# Patient Record
Sex: MALE | Race: WHITE | Employment: FULL TIME | URBAN - METROPOLITAN AREA
[De-identification: names, ages, dates, MRNs, and addresses within clinical notes are randomized per-mention and may not be internally consistent; named-entity substitution may affect disease eponyms.]

---

## 2019-05-15 ENCOUNTER — OFFICE VISIT (OUTPATIENT)
Dept: FAMILY MEDICINE CLINIC | Age: 59
End: 2019-05-15

## 2019-05-15 VITALS
BODY MASS INDEX: 30.21 KG/M2 | OXYGEN SATURATION: 98 % | TEMPERATURE: 98.7 F | WEIGHT: 243 LBS | SYSTOLIC BLOOD PRESSURE: 141 MMHG | DIASTOLIC BLOOD PRESSURE: 80 MMHG | HEART RATE: 93 BPM | HEIGHT: 75 IN

## 2019-05-15 DIAGNOSIS — E11.621 DIABETIC ULCER OF TOE OF RIGHT FOOT ASSOCIATED WITH TYPE 2 DIABETES MELLITUS, LIMITED TO BREAKDOWN OF SKIN (HCC): ICD-10-CM

## 2019-05-15 DIAGNOSIS — L97.511 DIABETIC ULCER OF TOE OF RIGHT FOOT ASSOCIATED WITH TYPE 2 DIABETES MELLITUS, LIMITED TO BREAKDOWN OF SKIN (HCC): ICD-10-CM

## 2019-05-15 DIAGNOSIS — L03.90 CELLULITIS, UNSPECIFIED CELLULITIS SITE: Primary | ICD-10-CM

## 2019-05-15 PROCEDURE — 96372 THER/PROPH/DIAG INJ SC/IM: CPT | Performed by: NURSE PRACTITIONER

## 2019-05-15 PROCEDURE — 99202 OFFICE O/P NEW SF 15 MIN: CPT | Performed by: NURSE PRACTITIONER

## 2019-05-15 RX ORDER — CEPHALEXIN 500 MG/1
500 CAPSULE ORAL 3 TIMES DAILY
Qty: 30 CAPSULE | Refills: 0 | Status: SHIPPED | OUTPATIENT
Start: 2019-05-15 | End: 2019-05-25

## 2019-05-15 RX ORDER — CEFTRIAXONE 500 MG/1
1000 INJECTION, POWDER, FOR SOLUTION INTRAMUSCULAR; INTRAVENOUS ONCE
Status: COMPLETED | OUTPATIENT
Start: 2019-05-15 | End: 2019-05-15

## 2019-05-15 RX ORDER — LISINOPRIL 30 MG/1
TABLET ORAL
Refills: 0 | COMMUNITY
Start: 2019-05-13 | End: 2021-05-19

## 2019-05-15 RX ORDER — DOXYCYCLINE HYCLATE 100 MG/1
100 CAPSULE ORAL 2 TIMES DAILY
Qty: 20 CAPSULE | Refills: 0 | Status: SHIPPED | OUTPATIENT
Start: 2019-05-15 | End: 2019-05-25

## 2019-05-15 RX ADMIN — CEFTRIAXONE 1000 MG: 500 INJECTION, POWDER, FOR SOLUTION INTRAMUSCULAR; INTRAVENOUS at 18:36

## 2019-05-15 ASSESSMENT — PATIENT HEALTH QUESTIONNAIRE - PHQ9
SUM OF ALL RESPONSES TO PHQ9 QUESTIONS 1 & 2: 0
1. LITTLE INTEREST OR PLEASURE IN DOING THINGS: 0
2. FEELING DOWN, DEPRESSED OR HOPELESS: 0
SUM OF ALL RESPONSES TO PHQ QUESTIONS 1-9: 0
SUM OF ALL RESPONSES TO PHQ QUESTIONS 1-9: 0

## 2019-05-15 NOTE — PROGRESS NOTES
Genitourinary: Negative for dysuria, frequency and urgency. Musculoskeletal: Negative for neck pain and neck stiffness. Skin: Positive for wound. Negative for rash. R great toe- ongoing x's 5 days    Allergic/Immunologic: Negative. Neurological: Negative for dizziness, weakness, light-headedness and headaches. Hematological: Negative for adenopathy. Psychiatric/Behavioral: Negative for confusion. Objective:      Physical Exam   Constitutional: He is oriented to person, place, and time. He appears well-developed and well-nourished. HENT:   Head: Normocephalic. Right Ear: External ear normal.   Left Ear: External ear normal.   Nose: Nose normal.   Mouth/Throat: Oropharynx is clear and moist.   Eyes: Pupils are equal, round, and reactive to light. Conjunctivae and EOM are normal.   Neck: Normal range of motion. Neck supple. Cardiovascular: Normal rate, regular rhythm and normal heart sounds. Exam reveals no gallop and no friction rub. No murmur heard. Pulmonary/Chest: Effort normal and breath sounds normal. No respiratory distress. Abdominal: Soft. Bowel sounds are normal.   Musculoskeletal: Normal range of motion. Lymphadenopathy:     He has no cervical adenopathy. Neurological: He is alert and oriented to person, place, and time. He has normal reflexes. No cranial nerve deficit. Coordination normal.   Skin: Skin is warm and dry. No rash noted. Circular lesion noted to dorsal aspect of R great toe. Edel-wound noted with erythema. Center of wound noted with redness and small amount purulent drainage. Psychiatric: He has a normal mood and affect. Thought content normal.   Vitals reviewed. BP (!) 141/80   Pulse 93   Temp 98.7 °F (37.1 °C) (Oral)   Ht 6' 3\" (1.905 m)   Wt 243 lb (110.2 kg)   SpO2 98%   BMI 30.37 kg/m²     Assessment:       Diagnosis Orders   1.  Cellulitis, unspecified cellulitis site  cefTRIAXone (ROCEPHIN) injection 1,000 mg    cephALEXin (Any Lowery) 500 MG capsule    doxycycline hyclate (VIBRAMYCIN) 100 MG capsule   2. Diabetic ulcer of toe of right foot associated with type 2 diabetes mellitus, limited to breakdown of skin (HCC)  cephALEXin (KEFLEX) 500 MG capsule    doxycycline hyclate (VIBRAMYCIN) 100 MG capsule             Plan:     1.) Rocephin IM injection given in office   2.) Start Keflex today   3.) Start Doxycycline today   4.) Epsom Salt foot soaks PRN   5.) Podiatry referral recommended patient declines at this time. 6.) RTO in 10 days for wound check     Problem List     None           Patient given educationalmaterials - see patient instructions. Discussed use, benefit, and side effectsof prescribed medications. All patient questions answered. Pt verbalized understanding. Instructed to continue current medications, diet and exercise. Patient agreedwith treatment plan. Follow up as directed.      Electronically signed by VIVEK Velazco CNP on 5/29/2019 at 10:30 AM

## 2019-05-28 ENCOUNTER — OFFICE VISIT (OUTPATIENT)
Dept: FAMILY MEDICINE CLINIC | Age: 59
End: 2019-05-28

## 2019-05-28 ENCOUNTER — HOSPITAL ENCOUNTER (OUTPATIENT)
Age: 59
Setting detail: SPECIMEN
Discharge: HOME | End: 2019-05-28

## 2019-05-28 VITALS
DIASTOLIC BLOOD PRESSURE: 80 MMHG | HEART RATE: 88 BPM | OXYGEN SATURATION: 99 % | HEIGHT: 74 IN | WEIGHT: 254 LBS | BODY MASS INDEX: 32.6 KG/M2 | TEMPERATURE: 98.1 F | SYSTOLIC BLOOD PRESSURE: 120 MMHG | RESPIRATION RATE: 20 BRPM

## 2019-05-28 DIAGNOSIS — E11.9 TYPE 2 DIABETES MELLITUS WITHOUT COMPLICATION, WITHOUT LONG-TERM CURRENT USE OF INSULIN (HCC): Primary | ICD-10-CM

## 2019-05-28 DIAGNOSIS — Z12.5 SCREENING FOR MALIGNANT NEOPLASM OF PROSTATE: ICD-10-CM

## 2019-05-28 DIAGNOSIS — E78.5 HYPERLIPIDEMIA, UNSPECIFIED HYPERLIPIDEMIA TYPE: ICD-10-CM

## 2019-05-28 DIAGNOSIS — Z11.59 ENCOUNTER FOR HEPATITIS C SCREENING TEST FOR LOW RISK PATIENT: ICD-10-CM

## 2019-05-28 DIAGNOSIS — E78.49 OTHER HYPERLIPIDEMIA: ICD-10-CM

## 2019-05-28 DIAGNOSIS — I10 ESSENTIAL HYPERTENSION: ICD-10-CM

## 2019-05-28 DIAGNOSIS — Z11.4 SCREENING FOR HIV (HUMAN IMMUNODEFICIENCY VIRUS): ICD-10-CM

## 2019-05-28 DIAGNOSIS — A49.02 MRSA INFECTION: ICD-10-CM

## 2019-05-28 DIAGNOSIS — E11.9 TYPE 2 DIABETES MELLITUS WITHOUT COMPLICATION, WITHOUT LONG-TERM CURRENT USE OF INSULIN (HCC): ICD-10-CM

## 2019-05-28 DIAGNOSIS — S91.331A PENETRATING WOUND OF RIGHT FOOT, INITIAL ENCOUNTER: ICD-10-CM

## 2019-05-28 DIAGNOSIS — Z12.11 SCREEN FOR COLON CANCER: ICD-10-CM

## 2019-05-28 DIAGNOSIS — L30.9 DERMATITIS: ICD-10-CM

## 2019-05-28 LAB
ABSOLUTE EOS #: 0.26 K/UL (ref 0–0.44)
ABSOLUTE IMMATURE GRANULOCYTE: <0.03 K/UL (ref 0–0.3)
ABSOLUTE LYMPH #: 2.17 K/UL (ref 1.1–3.7)
ABSOLUTE MONO #: 0.52 K/UL (ref 0.1–1.2)
ALBUMIN SERPL-MCNC: 4.3 G/DL (ref 3.5–5.2)
ALBUMIN/GLOBULIN RATIO: 1.5 (ref 1–2.5)
ALP BLD-CCNC: 60 U/L (ref 40–129)
ALT SERPL-CCNC: 12 U/L (ref 5–41)
ANION GAP SERPL CALCULATED.3IONS-SCNC: 15 MMOL/L (ref 9–17)
AST SERPL-CCNC: 12 U/L
BASOPHILS # BLD: 1 % (ref 0–2)
BASOPHILS ABSOLUTE: 0.07 K/UL (ref 0–0.2)
BILIRUB SERPL-MCNC: 0.69 MG/DL (ref 0.3–1.2)
BUN BLDV-MCNC: 18 MG/DL (ref 6–20)
BUN/CREAT BLD: ABNORMAL (ref 9–20)
CALCIUM SERPL-MCNC: 9.4 MG/DL (ref 8.6–10.4)
CHLORIDE BLD-SCNC: 98 MMOL/L (ref 98–107)
CHOLESTEROL/HDL RATIO: 4.5
CHOLESTEROL: 167 MG/DL
CO2: 23 MMOL/L (ref 20–31)
CREAT SERPL-MCNC: 0.83 MG/DL (ref 0.7–1.2)
DIFFERENTIAL TYPE: ABNORMAL
EOSINOPHILS RELATIVE PERCENT: 4 % (ref 1–4)
GFR AFRICAN AMERICAN: >60 ML/MIN
GFR NON-AFRICAN AMERICAN: >60 ML/MIN
GFR SERPL CREATININE-BSD FRML MDRD: ABNORMAL ML/MIN/{1.73_M2}
GFR SERPL CREATININE-BSD FRML MDRD: ABNORMAL ML/MIN/{1.73_M2}
GLUCOSE BLD-MCNC: 176 MG/DL (ref 70–99)
HCT VFR BLD CALC: 50.6 % (ref 40.7–50.3)
HDLC SERPL-MCNC: 37 MG/DL
HEMOGLOBIN: 15.5 G/DL (ref 13–17)
IMMATURE GRANULOCYTES: 0 %
LDL CHOLESTEROL: 96 MG/DL (ref 0–130)
LYMPHOCYTES # BLD: 31 % (ref 24–43)
MCH RBC QN AUTO: 28.1 PG (ref 25.2–33.5)
MCHC RBC AUTO-ENTMCNC: 30.6 G/DL (ref 28.4–34.8)
MCV RBC AUTO: 91.7 FL (ref 82.6–102.9)
MONOCYTES # BLD: 7 % (ref 3–12)
NRBC AUTOMATED: 0 PER 100 WBC
PDW BLD-RTO: 13.3 % (ref 11.8–14.4)
PLATELET # BLD: 211 K/UL (ref 138–453)
PLATELET ESTIMATE: ABNORMAL
PMV BLD AUTO: 11.8 FL (ref 8.1–13.5)
POTASSIUM SERPL-SCNC: 3.9 MMOL/L (ref 3.7–5.3)
RBC # BLD: 5.52 M/UL (ref 4.21–5.77)
RBC # BLD: ABNORMAL 10*6/UL
SEG NEUTROPHILS: 57 % (ref 36–65)
SEGMENTED NEUTROPHILS ABSOLUTE COUNT: 4.01 K/UL (ref 1.5–8.1)
SODIUM BLD-SCNC: 136 MMOL/L (ref 135–144)
TOTAL PROTEIN: 7.2 G/DL (ref 6.4–8.3)
TRIGL SERPL-MCNC: 172 MG/DL
VLDLC SERPL CALC-MCNC: ABNORMAL MG/DL (ref 1–30)
WBC # BLD: 7.1 K/UL (ref 3.5–11.3)
WBC # BLD: ABNORMAL 10*3/UL

## 2019-05-28 PROCEDURE — 99205 OFFICE O/P NEW HI 60 MIN: CPT | Performed by: NURSE PRACTITIONER

## 2019-05-28 RX ORDER — CEPHALEXIN 500 MG/1
500 CAPSULE ORAL 3 TIMES DAILY
COMMUNITY
End: 2019-05-28

## 2019-05-28 RX ORDER — BETAMETHASONE DIPROPIONATE 0.05 %
OINTMENT (GRAM) TOPICAL
Qty: 30 G | Refills: 0 | Status: SHIPPED | OUTPATIENT
Start: 2019-05-28 | End: 2019-06-28

## 2019-05-28 RX ORDER — LISINOPRIL 30 MG/1
30 TABLET ORAL DAILY
COMMUNITY
End: 2019-05-28 | Stop reason: SDUPTHER

## 2019-05-28 RX ORDER — ATORVASTATIN CALCIUM 10 MG/1
10 TABLET, FILM COATED ORAL DAILY
COMMUNITY
End: 2019-05-28

## 2019-05-28 RX ORDER — DOXYCYCLINE HYCLATE 100 MG/1
100 CAPSULE ORAL 2 TIMES DAILY
COMMUNITY
End: 2019-05-28

## 2019-05-28 RX ORDER — MUPIROCIN CALCIUM 20 MG/G
CREAM TOPICAL
Qty: 30 G | Refills: 0 | Status: SHIPPED | OUTPATIENT
Start: 2019-05-28 | End: 2019-06-27

## 2019-05-28 RX ORDER — LISINOPRIL 30 MG/1
30 TABLET ORAL DAILY
Qty: 90 TABLET | Refills: 1 | Status: SHIPPED | OUTPATIENT
Start: 2019-05-28 | End: 2021-05-19 | Stop reason: DRUGHIGH

## 2019-05-28 SDOH — HEALTH STABILITY: MENTAL HEALTH: HOW OFTEN DO YOU HAVE A DRINK CONTAINING ALCOHOL?: MONTHLY OR LESS

## 2019-05-28 ASSESSMENT — ENCOUNTER SYMPTOMS
DIARRHEA: 0
RHINORRHEA: 0
CONSTIPATION: 0
ABDOMINAL PAIN: 0
NAUSEA: 0
EYE DISCHARGE: 0
EYE ITCHING: 0
SORE THROAT: 0
SHORTNESS OF BREATH: 0
COUGH: 0
EYE REDNESS: 0

## 2019-05-28 ASSESSMENT — PATIENT HEALTH QUESTIONNAIRE - PHQ9
SUM OF ALL RESPONSES TO PHQ QUESTIONS 1-9: 0
SUM OF ALL RESPONSES TO PHQ9 QUESTIONS 1 & 2: 0
2. FEELING DOWN, DEPRESSED OR HOPELESS: 0
SUM OF ALL RESPONSES TO PHQ QUESTIONS 1-9: 0
1. LITTLE INTEREST OR PLEASURE IN DOING THINGS: 0

## 2019-05-28 NOTE — PROGRESS NOTES
Subjective:     HPI: Aakash Hernandez is a 62 y.o. male who presents in office today with self to establish care. Old PCP was Gloria Chavez NP in [facility]. Had a job down that way. New job here in town. Has been in town 1 month now. Colonoscopy completed 2010 [facility] ([facility] 254 pounds right now. Stays right around here. Last 5 or 7 years. Recently had MRSA infection. Chronic conditions include, diabetes, high blood pressure,   Can go through xray machines but screws in right wrist. 2006/6/8 quit good job to take care of mother. Dad was also sick with parkinson's. Doesn't have insurance for 90 days right now. Couldn''t get Obamacare. Cannot remember what his last A1C was. Doesn't remember when it was done either. Colonoscopy in 2010. Completely normal. Recently treated for MRSA on his right big toe. On Monday at walk in clinic he had an open ulcer wound. Steel toed shoes rubbed it and turned into an open wound. Treated with doxy and keflex. Treated for MRSA but wasn't tested for this. Shot or Rocephin is butt also. Finished antibiotics Saturday. Dermatitis on neck has been treated as cellulitis in the last year and a half. Some improvements but just comes back. No pain, itching, swelling, or warmth. Just is red and discolored. Wondering what this is. Healing well right now. No other concerns right now. HPI    Review of Systems   Constitutional: Negative for activity change, appetite change, fatigue and fever. HENT: Negative for congestion, ear pain, postnasal drip, rhinorrhea and sore throat. Eyes: Negative for discharge, redness and itching. Respiratory: Negative for cough and shortness of breath. Cardiovascular: Negative for chest pain. Gastrointestinal: Negative for abdominal pain, constipation, diarrhea and nausea. Genitourinary: Negative for dysuria. Musculoskeletal: Negative for arthralgias and myalgias. Skin: Positive for wound (right second toe. ). Comment:  22 years. Lifestyle    Physical activity:     Days per week: None     Minutes per session: None    Stress: None   Relationships    Social connections:     Talks on phone: None     Gets together: None     Attends Jewish service: None     Active member of club or organization: None     Attends meetings of clubs or organizations: None     Relationship status: None    Intimate partner violence:     Fear of current or ex partner: None     Emotionally abused: None     Physically abused: None     Forced sexual activity: None   Other Topics Concern    None   Social History Narrative    None     Past Medical History:   Diagnosis Date    Hyperlipemia     Hypertension     MRSA infection     Type 2 diabetes mellitus without complication ([facility] [address]     Wears glasses      Past Medical, Family, and Social History reviewed and does contribute to the patient presenting condition    Health Maintenance   Topic Date Due    Potassium monitoring  1960    Creatinine monitoring  1960    Hepatitis C screen  1960    HIV screen  07/04/1975    Lipid screen  07/04/2000    Diabetes screen  07/04/2000    Shingles Vaccine (1 of 2) 07/04/2010    Colon cancer screen colonoscopy  07/04/2010    Flu vaccine (Season Ended) 09/01/2019    DTaP/Tdap/Td vaccine (2 - Td) 05/28/2020    Pneumococcal 0-64 years Vaccine  Aged Out     PHQ Scores 5/28/2019   PHQ2 Score 0   PHQ9 Score 0     Interpretation of Total Score DepressionSeverity: 1-4 = Minimal depression, 5-9 = Mild depression, 10-14 = Moderate depression, 15-19 = Moderately severe depression, 20-27 = Severe depression    Current Outpatient Medications   Medication Sig Dispense Refill    mupirocin (BACTROBAN) 2 % cream Apply 3 times daily.  30 g 0    metFORMIN (GLUCOPHAGE) 1000 MG tablet Take 1 tablet by mouth 2 times daily (with meals) 180 tablet 0    lisinopril (PRINIVIL;ZESTRIL) 30 MG tablet Take 1 tablet by mouth daily 90 tablet 1    betamethasone dipropionate (DIPROLENE) 0.05 % ointment Apply topically twice daily for two weeks. 30 g 0     No current facility-administered medications for this visit. Objective:     /80 (Site: Left Upper Arm, Position: Sitting, Cuff Size: Medium Adult)   Pulse 88   Temp 98.1 °F (36.7 °C) (Tympanic)   Resp 20   Ht 6' 2\" (1.88 m)   Wt 254 lb (115.2 kg)   SpO2 99%   BMI 32.61 kg/m²      Physical Exam   Constitutional: He is oriented to person, place, and time. He appears well-developed and well-nourished. He is active. No distress. HENT:   Head: Normocephalic and atraumatic. Right Ear: Tympanic membrane and external ear normal.   Left Ear: Tympanic membrane and external ear normal.   Nose: Nose normal.   Mouth/Throat: Uvula is midline and oropharynx is clear and moist. No oropharyngeal exudate. Eyes: Pupils are equal, round, and reactive to light. Right eye exhibits no discharge. Left eye exhibits no discharge. Cardiovascular: Normal rate, regular rhythm and normal heart sounds. No murmur heard. Pulses:       Radial pulses are 2+ on the right side, and 2+ on the left side. Pulmonary/Chest: Effort normal and breath sounds normal. No respiratory distress. He has no decreased breath sounds. He has no wheezes. Abdominal: Soft. Bowel sounds are normal.   Musculoskeletal:        Right foot: There is normal range of motion and no deformity. No visible red or swollen joints to bilateral upper and lower extremities.   Visual inspection:  Deformity/amputation: absent  Skin lesions/pre-ulcerative calluses: present - right second phalanx  Edema: right- negative, left- negative  Sensory exam:  Monofilament sensation: abnormal - slight decrease at distal phalanx  (minimum of 5 random plantar locations tested, avoiding callused areas - > 1 area with absence of sensation is + for neuropathy)  Plus at least one of the following:  Pulses: normal,   Pinprick: N/A  Proprioception: Intact  Vibration (128 Hz): N/A     Feet:   Right Foot:   Skin Integrity: Positive for ulcer, skin breakdown and erythema. Negative for warmth or dry skin. Neurological: He is alert and oriented to person, place, and time. He has normal strength. Skin: Skin is warm, dry and intact. Capillary refill takes less than 2 seconds. No rash noted. There is erythema (left and right laterl neck, otherwise asymptomatic). Ulcerated but healing foot wound to right dorsal second phalanx. Scabbed over today. No drainage, warmth, or welling, dime sized. Picture in computer. Psychiatric: He has a normal mood and affect. His speech is normal and behavior is normal.   Vitals reviewed. 2 weeks ago          Assessment:      Diagnosis Orders   1. Type 2 diabetes mellitus without complication, without long-term current use of insulin ([facility])  Hemoglobin A1C    CBC Auto Differential    metFORMIN (GLUCOPHAGE) 1000 MG tablet    HM DIABETES FOOT EXAM   2. Essential hypertension  lisinopril (PRINIVIL;ZESTRIL) 30 MG tablet   3. Hyperlipidemia, unspecified hyperlipidemia type  Lipid Panel    Comprehensive Metabolic Panel   4. Screening for malignant neoplasm of prostate  Psa screening   5. Screening for HIV (human immunodeficiency virus)  HIV Screen   6. Encounter for hepatitis C screening test for low risk patient  Hepatitis C Antibody   7. Other hyperlipidemia     8. Screen for colon cancer  COLOGUARD (For external results only)   9. Penetrating wound of right foot, initial encounter  mupirocin (BACTROBAN) 2 % cream   10. MRSA infection     11. Dermatitis  betamethasone dipropionate (DIPROLENE) 0.05 % ointment     Plan:     Return in about 6 months (around 11/28/2019), or if symptoms worsen or fail to improve, for Chronic Conditions, Re-Check. Care established in office today. Cream for wound. Cream for dermatitis on neck, doesn't look to be infectious as it has been treated in past.   Discussed red flags. Medications sent.    Likely to based on scientific evidence and factoring in their multi-faceted history. Some aspects of the visit had to be cut short due to time but patient was made away of resources and will call if any concerns arise, will further discuss at follow up visit in 1 month.

## 2019-05-28 NOTE — PATIENT INSTRUCTIONS
Address: [id number], [place] [address]   Hours:   Open ?  Closes 5PM                                 Phone: [phone]

## 2019-05-29 DIAGNOSIS — E11.9 TYPE 2 DIABETES MELLITUS WITHOUT COMPLICATION, WITHOUT LONG-TERM CURRENT USE OF INSULIN (HCC): Primary | ICD-10-CM

## 2019-05-29 DIAGNOSIS — E78.49 OTHER HYPERLIPIDEMIA: ICD-10-CM

## 2019-05-29 LAB
ESTIMATED AVERAGE GLUCOSE: 192 MG/DL
HBA1C MFR BLD: 8.3 % (ref 4–6)

## 2019-05-29 RX ORDER — ATORVASTATIN CALCIUM 10 MG/1
10 TABLET, FILM COATED ORAL DAILY
Qty: 90 TABLET | Refills: 1 | Status: SHIPPED | OUTPATIENT
Start: 2019-05-29 | End: 2019-12-20 | Stop reason: SDUPTHER

## 2019-05-29 RX ORDER — PIOGLITAZONEHYDROCHLORIDE 30 MG/1
30 TABLET ORAL DAILY
Qty: 30 TABLET | Refills: 3 | Status: SHIPPED | OUTPATIENT
Start: 2019-05-29 | End: 2021-10-20

## 2019-05-29 ASSESSMENT — ENCOUNTER SYMPTOMS
NAUSEA: 0
CHEST TIGHTNESS: 0
EYE ITCHING: 0
SHORTNESS OF BREATH: 0
EYE DISCHARGE: 0
VOMITING: 0
COUGH: 0
DIARRHEA: 0
ABDOMINAL PAIN: 0
ALLERGIC/IMMUNOLOGIC NEGATIVE: 1
SORE THROAT: 0
EYES NEGATIVE: 1

## 2019-09-11 DIAGNOSIS — E11.9 TYPE 2 DIABETES MELLITUS WITHOUT COMPLICATION, WITHOUT LONG-TERM CURRENT USE OF INSULIN (HCC): ICD-10-CM

## 2019-12-20 DIAGNOSIS — E78.49 OTHER HYPERLIPIDEMIA: ICD-10-CM

## 2019-12-20 RX ORDER — ATORVASTATIN CALCIUM 10 MG/1
TABLET, FILM COATED ORAL
Qty: 90 TABLET | Refills: 0 | OUTPATIENT
Start: 2019-12-20

## 2019-12-21 RX ORDER — ATORVASTATIN CALCIUM 10 MG/1
10 TABLET, FILM COATED ORAL DAILY
Qty: 90 TABLET | Refills: 1 | Status: SHIPPED | OUTPATIENT
Start: 2019-12-21 | End: 2021-10-08

## 2020-04-13 NOTE — TELEPHONE ENCOUNTER
LOV 5/28/19  RTO 6 months; LM for patient to schedule F/U  [facility] 9/17/19    Health Maintenance   Topic Date Due    Hepatitis C screen  1960    Pneumococcal 0-64 years Vaccine (1 of 1 - PPSV23) 07/04/1966    Diabetic retinal exam  07/04/1970    HIV screen  07/04/1975    Diabetic microalbuminuria test  07/04/1978    Hepatitis B vaccine (1 of 3 - Risk 3-dose series) 07/04/1979    Shingles Vaccine (1 of 2) 07/04/2010    Colon cancer screen colonoscopy  07/04/2010    Diabetic foot exam  05/28/2020    A1C test (Diabetic or Prediabetic)  05/28/2020    Lipid screen  05/28/2020    DTaP/Tdap/Td vaccine (2 - Td) 05/28/2020    Flu vaccine (Season Ended) 09/01/2020    Hepatitis A vaccine  Aged Out    Hib vaccine  Aged Out    Meningococcal (ACWY) vaccine  Aged Out             (applicable per patient's age: Cancer Screenings, Depression Screening, Fall Risk Screening, Immunizations)    Hemoglobin A1C (%)   Date Value   05/28/2019 8.3 (H)     LDL Cholesterol (mg/dL)   Date Value   05/28/2019 96     AST (U/L)   Date Value   05/28/2019 12     ALT (U/L)   Date Value   05/28/2019 12     BUN (mg/dL)   Date Value   05/28/2019 18      (goal A1C is < 7)   (goal LDL is <100) need 30-50% reduction from baseline     BP Readings from Last 3 Encounters:   05/28/19 120/80    (goal /80)      All Future Testing planned in [facility]:  Lab Frequency Next Occurrence   Hemoglobin A1C Once 08/29/2019       Next Visit Date:  No future appointments.          Patient Active Problem List:     Type 2 diabetes mellitus without complication ([facility] [address]     MRSA infection     Hypertension     Other hyperlipidemia

## 2021-01-11 DIAGNOSIS — E11.9 TYPE 2 DIABETES MELLITUS WITHOUT COMPLICATION, WITHOUT LONG-TERM CURRENT USE OF INSULIN (HCC): ICD-10-CM

## 2021-05-10 DIAGNOSIS — E11.9 TYPE 2 DIABETES MELLITUS WITHOUT COMPLICATION, WITHOUT LONG-TERM CURRENT USE OF INSULIN (HCC): ICD-10-CM

## 2021-05-10 NOTE — TELEPHONE ENCOUNTER
Last visit: 5/28/19  Last Med refill: 1/12/21  Does patient have enough medication for 72 hours: No      Next Visit Date:  Future Appointments   Date Time Provider Selena Gutierrez   5/19/2021  7:30 AM VIVEK Jenkins NP [address] Maintenance   Topic Date Due    Hepatitis C screen  Never done    Pneumococcal 0-64 years Vaccine (1 of 1 - PPSV23) Never done    Diabetic retinal exam  Never done    HIV screen  Never done    COVID-19 Vaccine (1) Never done    Diabetic microalbuminuria test  Never done    Shingles Vaccine (1 of 2) Never done    Colon cancer screen colonoscopy  Never done    Diabetic foot exam  05/28/2020    A1C test (Diabetic or Prediabetic)  05/28/2020    Lipid screen  05/28/2020    DTaP/Tdap/Td vaccine (2 - Td) 05/28/2020    Potassium monitoring  05/28/2020    Creatinine monitoring  05/28/2020    Flu vaccine (Season Ended) 09/01/2021    Hepatitis A vaccine  Aged Out    Hib vaccine  Aged Out    Meningococcal (ACWY) vaccine  Aged Out       Hemoglobin A1C (%)   Date Value   05/28/2019 8.3 (H)             ( goal A1C is < 7)   No results found for: LABMICR  LDL Cholesterol (mg/dL)   Date Value   05/28/2019 96       (goal LDL is <100)   AST (U/L)   Date Value   05/28/2019 12     ALT (U/L)   Date Value   05/28/2019 12     BUN (mg/dL)   Date Value   05/28/2019 18     BP Readings from Last 3 Encounters:   05/28/19 120/80   05/15/19 (!) 141/80          (goal 120/80)    All Future Testing planned in [facility]              Patient Active Problem List:     Type 2 diabetes mellitus without complication ([facility] [address]     MRSA infection     Hypertension     Other hyperlipidemia

## 2021-05-10 NOTE — TELEPHONE ENCOUNTER
Patient requesting refill of Metformin to [facility]. Manju has an appt with RAVIN Alvarado 05/19/2021.   He is out of [employer]

## 2021-05-19 ENCOUNTER — OFFICE VISIT (OUTPATIENT)
Dept: FAMILY MEDICINE CLINIC | Age: 61
End: 2021-05-19

## 2021-05-19 VITALS
OXYGEN SATURATION: 99 % | WEIGHT: 232 LBS | DIASTOLIC BLOOD PRESSURE: 100 MMHG | BODY MASS INDEX: 28.85 KG/M2 | TEMPERATURE: 97.6 F | HEART RATE: 81 BPM | SYSTOLIC BLOOD PRESSURE: 172 MMHG | RESPIRATION RATE: 14 BRPM | HEIGHT: 75 IN

## 2021-05-19 DIAGNOSIS — E11.9 TYPE 2 DIABETES MELLITUS WITHOUT COMPLICATION, WITHOUT LONG-TERM CURRENT USE OF INSULIN (HCC): Primary | ICD-10-CM

## 2021-05-19 DIAGNOSIS — Z12.11 COLON CANCER SCREENING: ICD-10-CM

## 2021-05-19 DIAGNOSIS — I10 ESSENTIAL HYPERTENSION: ICD-10-CM

## 2021-05-19 DIAGNOSIS — Z12.5 PROSTATE CANCER SCREENING: ICD-10-CM

## 2021-05-19 PROBLEM — R22.1 NECK SWELLING: Status: ACTIVE | Noted: 2019-02-06

## 2021-05-19 PROBLEM — R21 RASH AND OTHER NONSPECIFIC SKIN ERUPTION: Status: ACTIVE | Noted: 2018-09-17

## 2021-05-19 PROBLEM — R21 RASH OF NECK: Status: ACTIVE | Noted: 2019-02-06

## 2021-05-19 LAB — HBA1C MFR BLD: 8.9 %

## 2021-05-19 PROCEDURE — 3052F HG A1C>EQUAL 8.0%<EQUAL 9.0%: CPT | Performed by: NURSE PRACTITIONER

## 2021-05-19 PROCEDURE — 83036 HEMOGLOBIN GLYCOSYLATED A1C: CPT | Performed by: NURSE PRACTITIONER

## 2021-05-19 PROCEDURE — 99204 OFFICE O/P NEW MOD 45 MIN: CPT | Performed by: NURSE PRACTITIONER

## 2021-05-19 RX ORDER — LISINOPRIL 10 MG/1
10 TABLET ORAL DAILY
Qty: 30 TABLET | Refills: 1 | Status: SHIPPED | OUTPATIENT
Start: 2021-05-19 | End: 2021-07-19

## 2021-05-19 ASSESSMENT — PATIENT HEALTH QUESTIONNAIRE - PHQ9
1. LITTLE INTEREST OR PLEASURE IN DOING THINGS: 0
SUM OF ALL RESPONSES TO PHQ QUESTIONS 1-9: 0
2. FEELING DOWN, DEPRESSED OR HOPELESS: 0

## 2021-05-19 ASSESSMENT — SOCIAL DETERMINANTS OF HEALTH (SDOH): HOW HARD IS IT FOR YOU TO PAY FOR THE VERY BASICS LIKE FOOD, HOUSING, MEDICAL CARE, AND HEATING?: NOT HARD AT ALL

## 2021-05-19 ASSESSMENT — VISUAL ACUITY: OU: 1

## 2021-05-19 NOTE — PROGRESS NOTES
Rajesh Carias (:  1960) is a 61 y.o. male,Established patient; new to this provider, here for evaluation of the following chief complaint(s):  Establish Care and Diabetes         ASSESSMENT/PLAN:  1. Type 2 diabetes mellitus without complication, without long-term current use of insulin (HCC)  -     Hemoglobin A1C; Future  -     Lipid, Fasting; Future  -     Comprehensive Metabolic Panel, Fasting; Future  -     CBC; Future  -     Microalbumin, Ur; Future  -     POCT glycosylated hemoglobin (Hb A1C)  -      DIABETES FOOT EXAM  -     SITagliptin (JANUVIA) 100 MG tablet; Take 1 tablet by mouth daily, Disp-90 tablet, R-3Sample  2. Essential hypertension  -     Lipid, Fasting; Future  -     Comprehensive Metabolic Panel, Fasting; Future  -     CBC; Future  -     lisinopril (PRINIVIL;ZESTRIL) 10 MG tablet; Take 1 tablet by mouth daily, Disp-30 tablet, R-1Normal  3. Colon cancer screening  -     Cologuard (For External Results Only); Future  4. Prostate cancer screening  -     PSA Screening; Future      Return in about 6 months (around 2021), or if symptoms worsen or fail to improve, for medication follow up, routine follow up, follow up to testing. Subjective   SUBJECTIVE/OBJECTIVE:  Was working in [place] as a  just before [employer]. Lost his job. Was stuck there until he could get unemployment and get back to [place]. Is working now at a [employer]. No insurance. Has lost some weight due to more physical activity  BP elevated today. He reports its much lower at home. Has not been taking any meds except metformin due to no insurance    Is . Has 3 kids. Kids are not around the area    Denies smoking, drinking, drug use      Review of Systems   Constitutional: Negative for activity change, appetite change, fatigue and fever. HENT: Negative for congestion, ear pain, postnasal drip, rhinorrhea and sore throat.     Eyes: Negative for discharge, redness, itching and visual disturbance. Wears glasses   Respiratory: Negative for cough, chest tightness, shortness of breath and wheezing. Cardiovascular: Negative for chest pain and palpitations. Gastrointestinal: Negative for abdominal distention, abdominal pain, constipation, diarrhea, nausea and vomiting. Endocrine: Negative for polydipsia, polyphagia and polyuria. Genitourinary: Negative for difficulty urinating, dysuria and frequency. Musculoskeletal: Negative for arthralgias and myalgias. Allergic/Immunologic:        NKA   Neurological: Negative for dizziness, light-headedness and headaches. Psychiatric/Behavioral: Negative for dysphoric mood and sleep disturbance. The patient is not nervous/anxious. Objective   Physical Exam  Vitals and nursing note reviewed. Constitutional:       General: He is not in acute distress. Appearance: Normal appearance. He is well-developed and well-groomed. He is not ill-appearing or toxic-appearing. HENT:      Head: Normocephalic and atraumatic. Right Ear: Tympanic membrane, ear canal and external ear normal. No middle ear effusion. There is no impacted cerumen. Tympanic membrane is not erythematous, retracted or bulging. Left Ear: Tympanic membrane, ear canal and external ear normal.  No middle ear effusion. There is no impacted cerumen. Tympanic membrane is not erythematous, retracted or bulging. Nose: Nose normal. No mucosal edema or rhinorrhea. Right Turbinates: Not enlarged or swollen. Left Turbinates: Not enlarged or swollen. Mouth/Throat:      Lips: Pink. Mouth: Mucous membranes are moist.      Pharynx: Oropharynx is clear. Uvula midline. No oropharyngeal exudate or posterior oropharyngeal erythema. Eyes:      General: Lids are normal. Vision grossly intact. Conjunctiva/sclera: Conjunctivae normal.   Neck:      Thyroid: No thyroid tenderness. Vascular: No carotid bruit.       Trachea: Trachea normal. Cardiovascular:      Rate and Rhythm: Normal rate and regular rhythm. Pulses: Normal pulses. Carotid pulses are 2+ on the right side and 2+ on the left side. Radial pulses are 2+ on the right side and 2+ on the left side. Dorsalis pedis pulses are 2+ on the right side and 2+ on the left side. Posterior tibial pulses are 2+ on the right side and 2+ on the left side. Heart sounds: Normal heart sounds, S1 normal and S2 normal. No murmur heard. Pulmonary:      Effort: Pulmonary effort is normal. No prolonged expiration or respiratory distress. Breath sounds: Normal breath sounds and air entry. No decreased breath sounds, wheezing, rhonchi or rales. Abdominal:      General: There is no distension. Palpations: Abdomen is soft. Tenderness: There is no abdominal tenderness. Musculoskeletal:         General: Normal range of motion. Cervical back: Normal range of motion. No signs of trauma or torticollis. No pain with movement. Right lower leg: No edema. Left lower leg: No edema. Lymphadenopathy:      Cervical: No cervical adenopathy. Skin:     General: Skin is warm and dry. Capillary Refill: Capillary refill takes less than 2 seconds. Coloration: Skin is not ashen, cyanotic, jaundiced or pale. Neurological:      Mental Status: He is alert and oriented to person, place, and time. Motor: Motor function is intact. Gait: Gait normal.   Psychiatric:         Attention and Perception: Attention and perception normal.         Mood and Affect: Mood and affect normal.         Speech: Speech normal.         Behavior: Behavior normal. Behavior is cooperative. Thought Content: Thought content normal.         Cognition and Memory: Cognition and memory normal.         Judgment: Judgment normal.                  An electronic signature was used to authenticate this note.     --VIVEK Mejia - HOLLY

## 2021-05-31 ASSESSMENT — ENCOUNTER SYMPTOMS
ABDOMINAL DISTENTION: 0
SORE THROAT: 0
VOMITING: 0
ABDOMINAL PAIN: 0
SHORTNESS OF BREATH: 0
COUGH: 0
WHEEZING: 0
RHINORRHEA: 0
CHEST TIGHTNESS: 0
EYE DISCHARGE: 0
EYE REDNESS: 0
EYE ITCHING: 0
DIARRHEA: 0
ALLERGIC/IMMUNOLOGIC COMMENTS: NKA
CONSTIPATION: 0
NAUSEA: 0

## 2021-07-17 DIAGNOSIS — I10 ESSENTIAL HYPERTENSION: ICD-10-CM

## 2021-07-19 NOTE — TELEPHONE ENCOUNTER
infection     Hypertension     Other hyperlipidemia     Neck swelling     Rash of neck     Rash and other nonspecific skin eruption

## 2021-07-20 RX ORDER — LISINOPRIL 10 MG/1
10 TABLET ORAL DAILY
Qty: 30 TABLET | Refills: 5 | Status: SHIPPED | OUTPATIENT
Start: 2021-07-20 | End: 2021-10-08 | Stop reason: ALTCHOICE

## 2021-08-24 DIAGNOSIS — E11.9 TYPE 2 DIABETES MELLITUS WITHOUT COMPLICATION, WITHOUT LONG-TERM CURRENT USE OF INSULIN (HCC): ICD-10-CM

## 2021-08-24 NOTE — TELEPHONE ENCOUNTER
----- Message from Maru Castro sent at 8/24/2021  4:59 PM EDT -----  Subject: Refill Request    QUESTIONS  Name of Medication? metFORMIN (GLUCOPHAGE) 1000 MG tablet  Patient-reported dosage and instructions? twice a day   How many days do you have left? 2  Preferred Pharmacy? [id number] [facility] phone number (if available)? [phone]  Additional Information for Provider? Patient has tried to make an   appointment to get these refill and there is no availability .   ---------------------------------------------------------------------------  --------------  CALL BACK INFO  What is the best way for the office to contact you? OK to leave message on   voicemail  Preferred Call Back Phone Number?  [phone]

## 2021-08-25 ENCOUNTER — TELEPHONE (OUTPATIENT)
Dept: FAMILY MEDICINE CLINIC | Age: 61
End: 2021-08-25

## 2021-08-25 NOTE — TELEPHONE ENCOUNTER
----- Message from Karthik Beth sent at 8/24/2021  4:57 PM EDT -----  Subject: Appointment Request    Reason for Call: Routine Existing Condition Follow Up (Diabetes)    QUESTIONS  Type of Appointment? Established Patient  Reason for appointment request? No appointments available during search  Additional Information for Provider? Patient is calling to make a follow   up appointment. Diabetes management and refills on his meds . Patient will   be out of his meds as of tomorrow. ---------------------------------------------------------------------------  --------------  Piero Valarie INFO  What is the best way for the office to contact you? OK to leave message on   voicemail  Preferred Call Back Phone Number? [phone]  ---------------------------------------------------------------------------  --------------  SCRIPT ANSWERS  Relationship to Patient? Self  (Is the patient requesting to be seen urgently for their symptoms?)? No  Is this follow up request related to routine Diabetes Management? Yes  Are you having any new concerns about your existing condition? No  Have you been diagnosed with, awaiting test results for, or told that you   are suspected of having COVID-19 (Coronavirus)? (If patient has tested   negative or was tested as a requirement for work, school, or travel and   not based on symptoms, answer no)? No  Do you currently have flu-like symptoms including fever or chills, cough,   shortness of breath, difficulty breathing, or new loss of taste or smell? No  Have you had close contact with someone with COVID-19 in the last 14 days? No  (Service Expert  click yes below to proceed with [employer] As Usual   Scheduling)?  Yes

## 2021-10-08 ENCOUNTER — OFFICE VISIT (OUTPATIENT)
Dept: FAMILY MEDICINE CLINIC | Age: 61
End: 2021-10-08
Payer: COMMERCIAL

## 2021-10-08 VITALS
OXYGEN SATURATION: 99 % | HEART RATE: 90 BPM | HEIGHT: 75 IN | RESPIRATION RATE: 14 BRPM | TEMPERATURE: 97.2 F | DIASTOLIC BLOOD PRESSURE: 89 MMHG | WEIGHT: 240 LBS | BODY MASS INDEX: 29.84 KG/M2 | SYSTOLIC BLOOD PRESSURE: 158 MMHG

## 2021-10-08 DIAGNOSIS — E11.9 TYPE 2 DIABETES MELLITUS WITHOUT COMPLICATION, WITHOUT LONG-TERM CURRENT USE OF INSULIN (HCC): Primary | ICD-10-CM

## 2021-10-08 DIAGNOSIS — I10 ESSENTIAL HYPERTENSION: ICD-10-CM

## 2021-10-08 PROCEDURE — 3052F HG A1C>EQUAL 8.0%<EQUAL 9.0%: CPT | Performed by: NURSE PRACTITIONER

## 2021-10-08 PROCEDURE — 99214 OFFICE O/P EST MOD 30 MIN: CPT | Performed by: NURSE PRACTITIONER

## 2021-10-08 RX ORDER — LISINOPRIL AND HYDROCHLOROTHIAZIDE 12.5; 1 MG/1; MG/1
1 TABLET ORAL DAILY
Qty: 90 TABLET | Refills: 1 | Status: SHIPPED | OUTPATIENT
Start: 2021-10-08 | End: 2022-04-19

## 2021-10-08 RX ORDER — BLOOD PRESSURE TEST KIT
1 KIT MISCELLANEOUS 2 TIMES DAILY
Qty: 1 KIT | Refills: 0 | Status: SHIPPED | OUTPATIENT
Start: 2021-10-08 | End: 2022-10-08

## 2021-10-08 ASSESSMENT — PATIENT HEALTH QUESTIONNAIRE - PHQ9
SUM OF ALL RESPONSES TO PHQ9 QUESTIONS 1 & 2: 0
1. LITTLE INTEREST OR PLEASURE IN DOING THINGS: 0
SUM OF ALL RESPONSES TO PHQ QUESTIONS 1-9: 0
2. FEELING DOWN, DEPRESSED OR HOPELESS: 0
SUM OF ALL RESPONSES TO PHQ QUESTIONS 1-9: 0
SUM OF ALL RESPONSES TO PHQ QUESTIONS 1-9: 0

## 2021-10-08 NOTE — PROGRESS NOTES
Kalpesh Vaughan (:  1960) is a 64 y.o. male,Established patient, here for evaluation of the following chief complaint(s):  Diabetes         ASSESSMENT/PLAN:  1. Type 2 diabetes mellitus without complication, without long-term current use of insulin ([facility] [address]  2. Essential hypertension  -     lisinopril-hydroCHLOROthiazide (PRINZIDE;ZESTORETIC) 10-12.5 MG per tablet; Take 1 tablet by mouth daily, Disp-90 tablet, R-1Normal  -     Blood Pressure KIT; 2 TIMES DAILY Starting Fri 10/8/2021, Until Sat 10/8/2022, For 365 days, Disp-1 kit, R-0, Normal      Return in about 6 months (around 2022), or if symptoms worsen or fail to improve, for medication follow up, follow up to testing. Subjective   SUBJECTIVE/OBJECTIVE:  Presents to office today for routine follow up. History of DM, HTN. Reports he is doing well. Is now working for [employer] in [place]. Things have improved since last visit. Now has a steady income. Thinking about retiring next year for one year and then re-enter the work force. 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 Reports a good appetite, drinking fluids. Normal bowel and bladder pattern. Sleeping ok-off due to night shift. Denies depression/anxiety concerns. Review of Systems   Constitutional: Negative for activity change, appetite change, fatigue and fever. HENT: Negative for congestion, ear pain, postnasal drip, rhinorrhea and sore throat. Eyes: Negative for discharge, redness, itching and visual disturbance. Wears glasses   Respiratory: Negative for cough, chest tightness, shortness of breath and wheezing. Cardiovascular: Negative for chest pain and palpitations. Gastrointestinal: Negative for abdominal distention, abdominal pain, constipation, diarrhea, nausea and vomiting. Endocrine: Negative for polydipsia, polyphagia and polyuria. Genitourinary: Negative for difficulty urinating, dysuria and frequency.    Musculoskeletal: Negative for arthralgias and myalgias. Allergic/Immunologic:        NKA   Neurological: Negative for dizziness, light-headedness and headaches. Psychiatric/Behavioral: Negative for dysphoric mood and sleep disturbance. The patient is not nervous/anxious. Objective   Physical Exam  Vitals and nursing note reviewed. Constitutional:       General: He is not in acute distress. Appearance: Normal appearance. He is well-developed and well-groomed. He is not ill-appearing or toxic-appearing. HENT:      Head: Normocephalic and atraumatic. Right Ear: Tympanic membrane, ear canal and external ear normal. No middle ear effusion. There is no impacted cerumen. Tympanic membrane is not erythematous, retracted or bulging. Left Ear: Tympanic membrane, ear canal and external ear normal.  No middle ear effusion. There is no impacted cerumen. Tympanic membrane is not erythematous, retracted or bulging. Nose: Nose normal. No mucosal edema or rhinorrhea. Right Turbinates: Not enlarged or swollen. Left Turbinates: Not enlarged or swollen. Mouth/Throat:      Lips: Pink. Mouth: Mucous membranes are moist.      Pharynx: Oropharynx is clear. Uvula midline. No oropharyngeal exudate or posterior oropharyngeal erythema. Eyes:      General: Lids are normal. Vision grossly intact. Conjunctiva/sclera: Conjunctivae normal.   Neck:      Thyroid: No thyroid tenderness. Vascular: No carotid bruit. Trachea: Trachea normal.   Cardiovascular:      Rate and Rhythm: Normal rate and regular rhythm. Pulses: Normal pulses. Carotid pulses are 2+ on the right side and 2+ on the left side. Radial pulses are 2+ on the right side and 2+ on the left side. Dorsalis pedis pulses are 2+ on the right side and 2+ on the left side. Posterior tibial pulses are 2+ on the right side and 2+ on the left side. Heart sounds: Normal heart sounds, S1 normal and S2 normal. No murmur heard. Pulmonary:      Effort: Pulmonary effort is normal. No prolonged expiration or respiratory distress. Breath sounds: Normal breath sounds and air entry. No decreased breath sounds, wheezing, rhonchi or rales. Abdominal:      General: There is no distension. Palpations: Abdomen is soft. Tenderness: There is no abdominal tenderness. Musculoskeletal:         General: Normal range of motion. Cervical back: Normal range of motion. No signs of trauma or torticollis. No pain with movement. Right lower leg: No edema. Left lower leg: No edema. Lymphadenopathy:      Cervical: No cervical adenopathy. Skin:     General: Skin is warm and dry. Capillary Refill: Capillary refill takes less than 2 seconds. Coloration: Skin is not ashen, cyanotic, jaundiced or pale. Neurological:      Mental Status: He is alert and oriented to person, place, and time. Motor: Motor function is intact. Gait: Gait normal.   Psychiatric:         Attention and Perception: Attention and perception normal.         Mood and Affect: Mood and affect normal.         Speech: Speech normal.         Behavior: Behavior normal. Behavior is cooperative. Thought Content: Thought content normal.         Cognition and Memory: Cognition and memory normal.         Judgment: Judgment normal.                  An electronic signature was used to authenticate this note.     --VIVEK Echavarria NP

## 2021-10-18 ASSESSMENT — VISUAL ACUITY: OU: 1

## 2021-10-18 ASSESSMENT — ENCOUNTER SYMPTOMS
VOMITING: 0
SHORTNESS OF BREATH: 0
EYE DISCHARGE: 0
RHINORRHEA: 0
EYE ITCHING: 0
ABDOMINAL PAIN: 0
CHEST TIGHTNESS: 0
SORE THROAT: 0
ABDOMINAL DISTENTION: 0
DIARRHEA: 0
CONSTIPATION: 0
NAUSEA: 0
EYE REDNESS: 0
COUGH: 0
ALLERGIC/IMMUNOLOGIC COMMENTS: NKA
WHEEZING: 0

## 2021-10-19 ENCOUNTER — HOSPITAL ENCOUNTER (OUTPATIENT)
Age: 61
Setting detail: SPECIMEN
Discharge: HOME | End: 2021-10-19
Payer: COMMERCIAL

## 2021-10-19 DIAGNOSIS — Z12.5 PROSTATE CANCER SCREENING: ICD-10-CM

## 2021-10-19 DIAGNOSIS — I10 ESSENTIAL HYPERTENSION: ICD-10-CM

## 2021-10-19 DIAGNOSIS — E11.9 TYPE 2 DIABETES MELLITUS WITHOUT COMPLICATION, WITHOUT LONG-TERM CURRENT USE OF INSULIN (HCC): ICD-10-CM

## 2021-10-19 LAB
ALBUMIN SERPL-MCNC: 4.6 G/DL (ref 3.5–5.2)
ALBUMIN/GLOBULIN RATIO: 1.5 (ref 1–2.5)
ALP BLD-CCNC: 84 U/L (ref 40–129)
ALT SERPL-CCNC: 16 U/L (ref 5–41)
ANION GAP SERPL CALCULATED.3IONS-SCNC: 16 MMOL/L (ref 9–17)
AST SERPL-CCNC: 14 U/L
BILIRUB SERPL-MCNC: 0.93 MG/DL (ref 0.3–1.2)
BUN BLDV-MCNC: 23 MG/DL (ref 8–23)
BUN/CREAT BLD: ABNORMAL (ref 9–20)
CALCIUM SERPL-MCNC: 9.7 MG/DL (ref 8.6–10.4)
CHLORIDE BLD-SCNC: 91 MMOL/L (ref 98–107)
CHOLESTEROL, FASTING: 238 MG/DL
CHOLESTEROL/HDL RATIO: 5.7
CO2: 25 MMOL/L (ref 20–31)
CREAT SERPL-MCNC: 0.81 MG/DL (ref 0.7–1.2)
CREATININE URINE: 86.4 MG/DL (ref 39–259)
GFR AFRICAN AMERICAN: >60 ML/MIN
GFR NON-AFRICAN AMERICAN: >60 ML/MIN
GFR SERPL CREATININE-BSD FRML MDRD: ABNORMAL ML/MIN/{1.73_M2}
GFR SERPL CREATININE-BSD FRML MDRD: ABNORMAL ML/MIN/{1.73_M2}
GLUCOSE FASTING: 255 MG/DL (ref 70–99)
HCT VFR BLD CALC: 53 % (ref 40.7–50.3)
HDLC SERPL-MCNC: 42 MG/DL
HEMOGLOBIN: 17.3 G/DL (ref 13–17)
LDL CHOLESTEROL: 128 MG/DL (ref 0–130)
MCH RBC QN AUTO: 29 PG (ref 25.2–33.5)
MCHC RBC AUTO-ENTMCNC: 32.6 G/DL (ref 28.4–34.8)
MCV RBC AUTO: 88.9 FL (ref 82.6–102.9)
MICROALBUMIN/CREAT 24H UR: <12 MG/L
MICROALBUMIN/CREAT UR-RTO: NORMAL MCG/MG CREAT
NRBC AUTOMATED: 0 PER 100 WBC
PDW BLD-RTO: 12.6 % (ref 11.8–14.4)
PLATELET # BLD: 241 K/UL (ref 138–453)
PMV BLD AUTO: 11.4 FL (ref 8.1–13.5)
POTASSIUM SERPL-SCNC: 3.9 MMOL/L (ref 3.7–5.3)
PROSTATE SPECIFIC ANTIGEN: 1.35 UG/L
RBC # BLD: 5.96 M/UL (ref 4.21–5.77)
SODIUM BLD-SCNC: 132 MMOL/L (ref 135–144)
TOTAL PROTEIN: 7.6 G/DL (ref 6.4–8.3)
TRIGLYCERIDE, FASTING: 341 MG/DL
VLDLC SERPL CALC-MCNC: ABNORMAL MG/DL (ref 1–30)
WBC # BLD: 9.7 K/UL (ref 3.5–11.3)

## 2021-10-20 ENCOUNTER — TELEPHONE (OUTPATIENT)
Dept: FAMILY MEDICINE CLINIC | Age: 61
End: 2021-10-20

## 2021-10-20 DIAGNOSIS — E78.2 MIXED HYPERLIPIDEMIA: Primary | ICD-10-CM

## 2021-10-20 DIAGNOSIS — E11.9 TYPE 2 DIABETES MELLITUS WITHOUT COMPLICATION, WITHOUT LONG-TERM CURRENT USE OF INSULIN (HCC): ICD-10-CM

## 2021-10-20 DIAGNOSIS — E78.2 MIXED HYPERLIPIDEMIA: ICD-10-CM

## 2021-10-20 LAB
ESTIMATED AVERAGE GLUCOSE: 249 MG/DL
HBA1C MFR BLD: 10.3 % (ref 4–6)

## 2021-10-20 RX ORDER — ATORVASTATIN CALCIUM 20 MG/1
20 TABLET, FILM COATED ORAL DAILY
Qty: 90 TABLET | Refills: 1 | Status: SHIPPED | OUTPATIENT
Start: 2021-10-20 | End: 2021-10-20 | Stop reason: SDUPTHER

## 2021-10-20 RX ORDER — ATORVASTATIN CALCIUM 20 MG/1
20 TABLET, FILM COATED ORAL DAILY
Qty: 90 TABLET | Refills: 1 | Status: SHIPPED | OUTPATIENT
Start: 2021-10-20 | End: 2022-04-19

## 2021-11-20 DIAGNOSIS — E11.9 TYPE 2 DIABETES MELLITUS WITHOUT COMPLICATION, WITHOUT LONG-TERM CURRENT USE OF INSULIN (HCC): ICD-10-CM

## 2021-11-22 NOTE — TELEPHONE ENCOUNTER
Last visit: 10/8/21  Last Med refill: 8/25/21    Next Visit Date:  No future appointments. Health Maintenance   Topic Date Due    Colon cancer screen colonoscopy  Never done    DTaP/Tdap/Td vaccine (2 - Td or Tdap) 05/19/2022 (Originally 5/28/2020)    Shingles Vaccine (1 of 2) 05/19/2022 (Originally 7/4/2010)    Pneumococcal 0-64 years Vaccine (1 of 2 - PPSV23) 05/19/2022 (Originally 7/4/1966)    COVID-19 Vaccine (1) 05/19/2022 (Originally 7/4/1972)    Diabetic retinal exam  05/29/2022 (Originally 7/4/1970)    Flu vaccine (1) 10/08/2022 (Originally 9/1/2021)    A1C test (Diabetic or Prediabetic)  01/19/2022    Diabetic foot exam  05/19/2022    Diabetic microalbuminuria test  10/19/2022    Lipid screen  10/19/2022    Potassium monitoring  10/19/2022    Creatinine monitoring  10/19/2022    Hepatitis A vaccine  Aged Out    Hib vaccine  Aged Out    Meningococcal (ACWY) vaccine  Aged Out    Hepatitis C screen  Discontinued    HIV screen  Discontinued       Hemoglobin A1C (%)   Date Value   10/19/2021 10.3 (H)   05/19/2021 8.9   05/28/2019 8.3 (H)             ( goal A1C is < 7)   Microalb/Crt.  Ratio (mcg/mg creat)   Date Value   10/19/2021 CANNOT BE CALCULATED     LDL Cholesterol (mg/dL)   Date Value   10/19/2021 128   05/28/2019 96       (goal LDL is <100)   AST (U/L)   Date Value   10/19/2021 14     ALT (U/L)   Date Value   10/19/2021 16     BUN (mg/dL)   Date Value   10/19/2021 23     BP Readings from Last 3 Encounters:   10/08/21 (!) 158/89   05/19/21 (!) 172/100   05/28/19 120/80          (goal 120/80)    All Future Testing planned in [facility]  Lab Frequency Next Occurrence   Cologuard (For External Results Only) Once 05/19/2022               Patient Active Problem List:     Type 2 diabetes mellitus without complication (HCC)     MRSA infection     Hypertension     Other hyperlipidemia     Neck swelling     Rash of neck     Rash and other nonspecific skin eruption

## 2021-12-28 NOTE — TELEPHONE ENCOUNTER
GENTEAL GEL AND AT Q 2-3 HOURS FOR COMFORT - REASURED PATIENT. Orders Pended for editing and approval.

## 2022-02-21 DIAGNOSIS — I10 ESSENTIAL HYPERTENSION: ICD-10-CM

## 2022-02-21 NOTE — TELEPHONE ENCOUNTER
Last visit: 10/08/21  Last Med refill: 10/08/21  Does patient have enough medication for 72 hours: Yes    Next Visit Date:  No future appointments. Health Maintenance   Topic Date Due    Colorectal Cancer Screen  Never done    A1C test (Diabetic or Prediabetic)  01/19/2022    DTaP/Tdap/Td vaccine (2 - Td or Tdap) 05/19/2022 (Originally 5/28/2020)    Shingles Vaccine (1 of 2) 05/19/2022 (Originally 7/4/2010)    Pneumococcal 0-64 years Vaccine (1 of 2 - PPSV23) 05/19/2022 (Originally 7/4/1966)    COVID-19 Vaccine (1) 05/19/2022 (Originally 7/4/1965)    Diabetic retinal exam  05/29/2022 (Originally 7/4/1978)    Flu vaccine (1) 10/08/2022 (Originally 9/1/2021)    Diabetic foot exam  05/19/2022    Depression Screen  10/08/2022    Diabetic microalbuminuria test  10/19/2022    Lipid screen  10/19/2022    Potassium monitoring  10/19/2022    Creatinine monitoring  10/19/2022    Hepatitis A vaccine  Aged Out    Hib vaccine  Aged Out    Meningococcal (ACWY) vaccine  Aged Out    Hepatitis C screen  Discontinued    HIV screen  Discontinued       Hemoglobin A1C (%)   Date Value   10/19/2021 10.3 (H)   05/19/2021 8.9   05/28/2019 8.3 (H)             ( goal A1C is < 7)   Microalb/Crt.  Ratio (mcg/mg creat)   Date Value   10/19/2021 CANNOT BE CALCULATED     LDL Cholesterol (mg/dL)   Date Value   10/19/2021 128   05/28/2019 96       (goal LDL is <100)   AST (U/L)   Date Value   10/19/2021 14     ALT (U/L)   Date Value   10/19/2021 16     BUN (mg/dL)   Date Value   10/19/2021 23     BP Readings from Last 3 Encounters:   10/08/21 (!) 158/89   05/19/21 (!) 172/100   05/28/19 120/80          (goal 120/80)    All Future Testing planned in [facility]  Lab Frequency Next Occurrence   Cologuard (For External Results Only) Once 05/19/2022               Patient Active Problem List:     Type 2 diabetes mellitus without complication (HCC)     MRSA infection     Hypertension     Other hyperlipidemia     Neck swelling     Rash of neck     Rash and other nonspecific skin eruption

## 2022-02-22 RX ORDER — LISINOPRIL 10 MG/1
TABLET ORAL
Qty: 90 TABLET | OUTPATIENT
Start: 2022-02-22

## 2022-03-12 DIAGNOSIS — E11.9 TYPE 2 DIABETES MELLITUS WITHOUT COMPLICATION, WITHOUT LONG-TERM CURRENT USE OF INSULIN (HCC): ICD-10-CM

## 2022-03-14 NOTE — TELEPHONE ENCOUNTER
Last visit: 10/08/21  Last Med refill: 11/22/21  Does patient have enough medication for 72 hours: Yes    Next Visit Date:  No future appointments. Health Maintenance   Topic Date Due    Colorectal Cancer Screen  Never done    A1C test (Diabetic or Prediabetic)  01/19/2022    DTaP/Tdap/Td vaccine (2 - Td or Tdap) 05/19/2022 (Originally 5/28/2020)    Shingles Vaccine (1 of 2) 05/19/2022 (Originally 7/4/2010)    Pneumococcal 0-64 years Vaccine (1 of 2 - PPSV23) 05/19/2022 (Originally 7/4/1966)    COVID-19 Vaccine (1) 05/19/2022 (Originally 7/4/1965)    Diabetic retinal exam  05/29/2022 (Originally 7/4/1978)    Flu vaccine (1) 10/08/2022 (Originally 9/1/2021)    Diabetic foot exam  05/19/2022    Depression Screen  10/08/2022    Diabetic microalbuminuria test  10/19/2022    Lipid screen  10/19/2022    Potassium monitoring  10/19/2022    Creatinine monitoring  10/19/2022    Hepatitis A vaccine  Aged Out    Hib vaccine  Aged Out    Meningococcal (ACWY) vaccine  Aged Out    Hepatitis C screen  Discontinued    HIV screen  Discontinued       Hemoglobin A1C (%)   Date Value   10/19/2021 10.3 (H)   05/19/2021 8.9   05/28/2019 8.3 (H)             ( goal A1C is < 7)   Microalb/Crt.  Ratio (mcg/mg creat)   Date Value   10/19/2021 CANNOT BE CALCULATED     LDL Cholesterol (mg/dL)   Date Value   10/19/2021 128   05/28/2019 96       (goal LDL is <100)   AST (U/L)   Date Value   10/19/2021 14     ALT (U/L)   Date Value   10/19/2021 16     BUN (mg/dL)   Date Value   10/19/2021 23     BP Readings from Last 3 Encounters:   10/08/21 (!) 158/89   05/19/21 (!) 172/100   05/28/19 120/80          (goal 120/80)    All Future Testing planned in [facility]  Lab Frequency Next Occurrence   Cologuard (For External Results Only) Once 05/19/2022               Patient Active Problem List:     Type 2 diabetes mellitus without complication (HCC)     MRSA infection     Hypertension     Other hyperlipidemia     Neck swelling     Rash of neck     Rash and other nonspecific skin eruption

## 2022-03-31 ENCOUNTER — OFFICE VISIT (OUTPATIENT)
Dept: PRIMARY CARE CLINIC | Age: 62
End: 2022-03-31
Payer: COMMERCIAL

## 2022-03-31 VITALS
OXYGEN SATURATION: 96 % | WEIGHT: 240 LBS | SYSTOLIC BLOOD PRESSURE: 126 MMHG | HEART RATE: 100 BPM | DIASTOLIC BLOOD PRESSURE: 84 MMHG | BODY MASS INDEX: 30 KG/M2 | TEMPERATURE: 97.9 F

## 2022-03-31 DIAGNOSIS — B35.6 JOCK ITCH: ICD-10-CM

## 2022-03-31 DIAGNOSIS — L03.90 CELLULITIS, UNSPECIFIED CELLULITIS SITE: Primary | ICD-10-CM

## 2022-03-31 PROCEDURE — 99214 OFFICE O/P EST MOD 30 MIN: CPT | Performed by: FAMILY MEDICINE

## 2022-03-31 RX ORDER — DOXYCYCLINE HYCLATE 100 MG
100 TABLET ORAL 2 TIMES DAILY
Qty: 20 TABLET | Refills: 0 | Status: SHIPPED | OUTPATIENT
Start: 2022-03-31 | End: 2022-04-25 | Stop reason: SDUPTHER

## 2022-03-31 RX ORDER — KETOCONAZOLE 20 MG/G
1 CREAM TOPICAL 2 TIMES DAILY
Qty: 60 G | Refills: 3 | Status: SHIPPED | OUTPATIENT
Start: 2022-03-31 | End: 2022-10-11

## 2022-03-31 NOTE — PROGRESS NOTES
Subjective:  Star Virk presents for   Chief Complaint   Patient presents with    Rash     rash in groin area. Has gotten worse. Started 2-3 days ago. Has spread. Hx of diabetes. Itches a lot. It is itchy    Seems to have spread quickly in the past day. No fevers or chills     no problem with urination  He is a diabetic    Patient Active Problem List   Diagnosis    Type 2 diabetes mellitus without complication ([address]    MRSA infection    Hypertension    Other hyperlipidemia    Neck swelling    Rash of neck    Rash and other nonspecific skin eruption       · . Objective:  Physical Exam   Vitals: Wt Readings from Last 3 Encounters:   03/31/22 240 lb (108.9 kg)   10/08/21 240 lb (108.9 kg)   05/19/21 232 lb (105.2 kg)     Ht Readings from Last 3 Encounters:   10/08/21 6' 3\" (1.905 m)   05/19/21 6' 3\" (1.905 m)   05/28/19 6' 2\" (1.88 m)     Body mass index is 30 kg/m². Vitals:    03/31/22 1303   BP: 126/84   Site: Left Upper Arm   Position: Sitting   Cuff Size: Medium Adult   Pulse: 100   Temp: 97.9 °F (36.6 °C)   SpO2: 96%   Weight: 240 lb (108.9 kg)       Constitutional: He is oriented to person, place, and time. He appears well-developed and well-nourished and in no acute distress. Answers all my questions appropriately. Groin - on medial thigh , in the groin /erineal are, and the scrotum he has extensive light erythema. No visicale or opne wounds    No pain to touch. Assessment:   Encounter Diagnoses   Name Primary?  Cellulitis, unspecified cellulitis site Yes    Jock itch          Plan:   Most concern is for possible celutlis due to how fast this has come on and the diabetes. iwll cover with doxy. Add ketoconazole    Discussed gentle skin care. Sx worsen , or having systemic sx, come issac. There are no discontinued medications. THE ABOVE NOTED DISCONTINUED MEDS MAY ONLY BE FROM 'CLEANING UP' THE MED LIST AND WERE NOT ACTUALLY CANCELED;  SEE CHART FOR DETAILS!   Orders Placed This Encounter   Medications    doxycycline hyclate (VIBRA-TABS) 100 MG tablet     Sig: Take 1 tablet by mouth 2 times daily for 10 days     Dispense:  20 tablet     Refill:  0    ketoconazole (NIZORAL) 2 % cream     Sig: Apply 1 Applicatorful topically 2 times daily Apply topically BID for 2-4 weeks     Dispense:  60 g     Refill:  3     No orders of the defined types were placed in this encounter. Return in about 2 weeks (around 4/14/2022). There are no Patient Instructions on file for this visit.   Follow up with your provider

## 2022-03-31 NOTE — LETTER
[address] [address]  Phone: [phone]  Fax: [phone]    Guanako Sanchez MD        April 5, 2022     Patient: Maria Esther Mcnamara   YOB: 1960   Date of Visit: 3/31/2022       To Whom It May Concern: This patient was seen in clinic on Thursday, 3/31/22, and was unable to work on Friday, April 1st, 2022. It is my medical opinion that Maria Esther Mcnamara may return back to work as scheduled on Tuesday, April 5th, 2022. If you have any questions or concerns, please don't hesitate to call.     Sincerely,        Guanako Sanchez MD

## 2022-04-19 DIAGNOSIS — I10 ESSENTIAL HYPERTENSION: ICD-10-CM

## 2022-04-19 DIAGNOSIS — E78.2 MIXED HYPERLIPIDEMIA: ICD-10-CM

## 2022-04-19 RX ORDER — ATORVASTATIN CALCIUM 20 MG/1
TABLET, FILM COATED ORAL
Qty: 90 TABLET | Refills: 1 | Status: SHIPPED | OUTPATIENT
Start: 2022-04-19

## 2022-04-19 RX ORDER — LISINOPRIL AND HYDROCHLOROTHIAZIDE 12.5; 1 MG/1; MG/1
TABLET ORAL
Qty: 90 TABLET | Refills: 1 | Status: SHIPPED | OUTPATIENT
Start: 2022-04-19 | End: 2022-11-04

## 2022-04-19 NOTE — TELEPHONE ENCOUNTER
Last visit: 10/08/21  Last Med refill: 10/20/21  Does patient have enough medication for 72 hours: Yes    Next Visit Date:  No future appointments. Health Maintenance   Topic Date Due    Colorectal Cancer Screen  Never done    A1C test (Diabetic or Prediabetic)  01/19/2022    DTaP/Tdap/Td vaccine (2 - Td or Tdap) 05/19/2022 (Originally 5/28/2020)    Shingles Vaccine (1 of 2) 05/19/2022 (Originally 7/4/2010)    Pneumococcal 0-64 years Vaccine (1 - PCV) 05/19/2022 (Originally 7/4/1966)    COVID-19 Vaccine (1) 05/19/2022 (Originally 7/4/1965)    Diabetic retinal exam  05/29/2022 (Originally 7/4/1978)    Flu vaccine (Season Ended) 10/08/2022 (Originally 9/1/2022)    Diabetic foot exam  05/19/2022    Depression Screen  10/08/2022    Diabetic microalbuminuria test  10/19/2022    Lipid screen  10/19/2022    Potassium monitoring  10/19/2022    Creatinine monitoring  10/19/2022    Hepatitis A vaccine  Aged Out    Hib vaccine  Aged Out    Meningococcal (ACWY) vaccine  Aged Out    Hepatitis C screen  Discontinued    HIV screen  Discontinued       Hemoglobin A1C (%)   Date Value   10/19/2021 10.3 (H)   05/19/2021 8.9   05/28/2019 8.3 (H)             ( goal A1C is < 7)   Microalb/Crt.  Ratio (mcg/mg creat)   Date Value   10/19/2021 CANNOT BE CALCULATED     LDL Cholesterol (mg/dL)   Date Value   10/19/2021 128   05/28/2019 96       (goal LDL is <100)   AST (U/L)   Date Value   10/19/2021 14     ALT (U/L)   Date Value   10/19/2021 16     BUN (mg/dL)   Date Value   10/19/2021 23     BP Readings from Last 3 Encounters:   03/31/22 126/84   10/08/21 (!) 158/89   05/19/21 (!) 172/100          (goal 120/80)    All Future Testing planned in [facility]  Lab Frequency Next Occurrence   Cologuard (For External Results Only) Once 05/19/2022               Patient Active Problem List:     Type 2 diabetes mellitus without complication (HCC)     MRSA infection     Hypertension     Other hyperlipidemia     Neck swelling Rash of neck     Rash and other nonspecific skin eruption

## 2022-04-19 NOTE — TELEPHONE ENCOUNTER
Last visit: 10/08/21  Last Med refill: 10/08/21  Does patient have enough medication for 72 hours: Yes    Next Visit Date:  No future appointments. Health Maintenance   Topic Date Due    Colorectal Cancer Screen  Never done    A1C test (Diabetic or Prediabetic)  01/19/2022    DTaP/Tdap/Td vaccine (2 - Td or Tdap) 05/19/2022 (Originally 5/28/2020)    Shingles Vaccine (1 of 2) 05/19/2022 (Originally 7/4/2010)    Pneumococcal 0-64 years Vaccine (1 - PCV) 05/19/2022 (Originally 7/4/1966)    COVID-19 Vaccine (1) 05/19/2022 (Originally 7/4/1965)    Diabetic retinal exam  05/29/2022 (Originally 7/4/1978)    Flu vaccine (Season Ended) 10/08/2022 (Originally 9/1/2022)    Diabetic foot exam  05/19/2022    Depression Screen  10/08/2022    Diabetic microalbuminuria test  10/19/2022    Lipid screen  10/19/2022    Potassium monitoring  10/19/2022    Creatinine monitoring  10/19/2022    Hepatitis A vaccine  Aged Out    Hib vaccine  Aged Out    Meningococcal (ACWY) vaccine  Aged Out    Hepatitis C screen  Discontinued    HIV screen  Discontinued       Hemoglobin A1C (%)   Date Value   10/19/2021 10.3 (H)   05/19/2021 8.9   05/28/2019 8.3 (H)             ( goal A1C is < 7)   Microalb/Crt.  Ratio (mcg/mg creat)   Date Value   10/19/2021 CANNOT BE CALCULATED     LDL Cholesterol (mg/dL)   Date Value   10/19/2021 128   05/28/2019 96       (goal LDL is <100)   AST (U/L)   Date Value   10/19/2021 14     ALT (U/L)   Date Value   10/19/2021 16     BUN (mg/dL)   Date Value   10/19/2021 23     BP Readings from Last 3 Encounters:   03/31/22 126/84   10/08/21 (!) 158/89   05/19/21 (!) 172/100          (goal 120/80)    All Future Testing planned in [facility]  Lab Frequency Next Occurrence   Cologuard (For External Results Only) Once 05/19/2022               Patient Active Problem List:     Type 2 diabetes mellitus without complication (HCC)     MRSA infection     Hypertension     Other hyperlipidemia     Neck swelling Rash of neck     Rash and other nonspecific skin eruption

## 2022-04-25 ENCOUNTER — OFFICE VISIT (OUTPATIENT)
Dept: FAMILY MEDICINE CLINIC | Age: 62
End: 2022-04-25
Payer: COMMERCIAL

## 2022-04-25 VITALS
BODY MASS INDEX: 29.37 KG/M2 | WEIGHT: 236.2 LBS | HEIGHT: 75 IN | SYSTOLIC BLOOD PRESSURE: 158 MMHG | DIASTOLIC BLOOD PRESSURE: 100 MMHG | HEART RATE: 93 BPM | OXYGEN SATURATION: 98 %

## 2022-04-25 DIAGNOSIS — R21 RASH AND OTHER NONSPECIFIC SKIN ERUPTION: Primary | ICD-10-CM

## 2022-04-25 DIAGNOSIS — A49.02 MRSA INFECTION: ICD-10-CM

## 2022-04-25 PROCEDURE — 99214 OFFICE O/P EST MOD 30 MIN: CPT | Performed by: NURSE PRACTITIONER

## 2022-04-25 RX ORDER — DOXYCYCLINE HYCLATE 100 MG
TABLET ORAL
Qty: 20 TABLET | Refills: 0 | OUTPATIENT
Start: 2022-04-25

## 2022-04-25 RX ORDER — DOXYCYCLINE HYCLATE 100 MG
100 TABLET ORAL 2 TIMES DAILY
Qty: 42 TABLET | Refills: 0 | Status: SHIPPED | OUTPATIENT
Start: 2022-04-25 | End: 2022-06-21

## 2022-04-25 RX ORDER — FLUCONAZOLE 150 MG/1
150 TABLET ORAL
Qty: 4 TABLET | Refills: 0 | Status: SHIPPED | OUTPATIENT
Start: 2022-04-25 | End: 2022-05-03 | Stop reason: SDUPTHER

## 2022-04-25 NOTE — LETTER
[facility]  [address] [place] [address]  Phone: [phone]  Fax: [phone]    VIVEK Stephenson NP        April 25, 2022     Patient: Tiki Bai   YOB: 1960   Date of Visit: 4/25/2022       To Whom it May Concern:    Tiki Bai was seen in my clinic on 4/25/2022. He may return to work on 4/26/22. If you have any questions or concerns, please don't hesitate to call.     Sincerely,         VIVEK Stephenson NP

## 2022-04-25 NOTE — PROGRESS NOTES
Emanuel Morrison (:  1960) is a 64 y.o. male,Established patient, here for evaluation of the following chief complaint(s):  Rash (Started 3 wks ago, was seen in walk-in. Itching, burning.  )         ASSESSMENT/PLAN:  1. Rash and other nonspecific skin eruption  -     doxycycline hyclate (VIBRA-TABS) 100 MG tablet; Take 1 tablet by mouth 2 times daily for 21 days, Disp-42 tablet, R-0Normal  -     fluconazole (DIFLUCAN) 150 MG tablet; Take 1 tablet by mouth every 72 hours for 4 doses, Disp-4 tablet, R-0Adjust Sig  2. MRSA infection  -     doxycycline hyclate (VIBRA-TABS) 100 MG tablet; Take 1 tablet by mouth 2 times daily for 21 days, Disp-42 tablet, R-0Normal  -     fluconazole (DIFLUCAN) 150 MG tablet; Take 1 tablet by mouth every 72 hours for 4 doses, Disp-4 tablet, R-0Adjust Sig    Proceed with medications as ordered  Encouraged adequate skin hygiene  Educated regarding making sure folds are clean and dry  Monitor for any increase in symptoms  Call office with any questions or concerns  Follow-up as discussed    Return in about 2 weeks (around 2022), or if symptoms worsen or fail to improve. Subjective   SUBJECTIVE/OBJECTIVE:  Presents to office today with concerns of ongoing rash in his groin area. Was evaluated in the walk-in a couple weeks ago. Treatment did help somewhat but rash continues. Will treat with antibiotic and antifungal and have him follow-up in a couple weeks. Review of Systems   Skin: Positive for rash (groin). Objective   Physical Exam  Vitals and nursing note reviewed. Constitutional:       General: He is not in acute distress. Appearance: He is not ill-appearing. HENT:      Head: Normocephalic. Nose: Nose normal.      Mouth/Throat:      Lips: Pink. Pulmonary:      Effort: Pulmonary effort is normal. No respiratory distress. Neurological:      Mental Status: He is alert and oriented to person, place, and time.    Psychiatric:         Attention and Perception: Attention normal.         Speech: Speech normal.         Behavior: Behavior is cooperative. On this date 4/25/2022 I have spent minutes reviewing previous notes, test results and face to face with the patient discussing the diagnosis and importance of compliance with the treatment plan as well as documenting on the day of the visit. An electronic signature was used to authenticate this note.     --VIVEK Cheek - NP

## 2022-05-03 ENCOUNTER — OFFICE VISIT (OUTPATIENT)
Dept: FAMILY MEDICINE CLINIC | Age: 62
End: 2022-05-03
Payer: COMMERCIAL

## 2022-05-03 VITALS
DIASTOLIC BLOOD PRESSURE: 82 MMHG | BODY MASS INDEX: 29.09 KG/M2 | OXYGEN SATURATION: 99 % | SYSTOLIC BLOOD PRESSURE: 137 MMHG | HEART RATE: 80 BPM | HEIGHT: 75 IN | WEIGHT: 234 LBS | TEMPERATURE: 98.3 F | RESPIRATION RATE: 16 BRPM

## 2022-05-03 DIAGNOSIS — A49.02 MRSA INFECTION: ICD-10-CM

## 2022-05-03 DIAGNOSIS — E11.9 TYPE 2 DIABETES MELLITUS WITHOUT COMPLICATION, WITHOUT LONG-TERM CURRENT USE OF INSULIN (HCC): ICD-10-CM

## 2022-05-03 DIAGNOSIS — R21 RASH AND OTHER NONSPECIFIC SKIN ERUPTION: Primary | ICD-10-CM

## 2022-05-03 LAB — HBA1C MFR BLD: 8.8 %

## 2022-05-03 PROCEDURE — 3052F HG A1C>EQUAL 8.0%<EQUAL 9.0%: CPT | Performed by: NURSE PRACTITIONER

## 2022-05-03 PROCEDURE — 99214 OFFICE O/P EST MOD 30 MIN: CPT | Performed by: NURSE PRACTITIONER

## 2022-05-03 PROCEDURE — 83036 HEMOGLOBIN GLYCOSYLATED A1C: CPT | Performed by: NURSE PRACTITIONER

## 2022-05-03 RX ORDER — FLUCONAZOLE 150 MG/1
150 TABLET ORAL
Qty: 4 TABLET | Refills: 0 | Status: SHIPPED | OUTPATIENT
Start: 2022-05-03 | End: 2022-06-21

## 2022-05-03 RX ORDER — NYSTATIN 100000 [USP'U]/G
POWDER TOPICAL
Qty: 30 G | Refills: 0 | Status: SHIPPED | OUTPATIENT
Start: 2022-05-03 | End: 2022-05-26

## 2022-05-03 ASSESSMENT — ANXIETY QUESTIONNAIRES
IF YOU CHECKED OFF ANY PROBLEMS ON THIS QUESTIONNAIRE, HOW DIFFICULT HAVE THESE PROBLEMS MADE IT FOR YOU TO DO YOUR WORK, TAKE CARE OF THINGS AT HOME, OR GET ALONG WITH OTHER PEOPLE: NOT DIFFICULT AT ALL
2. NOT BEING ABLE TO STOP OR CONTROL WORRYING: 0
3. WORRYING TOO MUCH ABOUT DIFFERENT THINGS: 0
5. BEING SO RESTLESS THAT IT IS HARD TO SIT STILL: 0
7. FEELING AFRAID AS IF SOMETHING AWFUL MIGHT HAPPEN: 0
6. BECOMING EASILY ANNOYED OR IRRITABLE: 0
GAD7 TOTAL SCORE: 0
1. FEELING NERVOUS, ANXIOUS, OR ON EDGE: 0
4. TROUBLE RELAXING: 0

## 2022-05-03 ASSESSMENT — PATIENT HEALTH QUESTIONNAIRE - PHQ9
SUM OF ALL RESPONSES TO PHQ QUESTIONS 1-9: 0
SUM OF ALL RESPONSES TO PHQ QUESTIONS 1-9: 0
2. FEELING DOWN, DEPRESSED OR HOPELESS: 0
SUM OF ALL RESPONSES TO PHQ QUESTIONS 1-9: 0
SUM OF ALL RESPONSES TO PHQ9 QUESTIONS 1 & 2: 0
1. LITTLE INTEREST OR PLEASURE IN DOING THINGS: 0
SUM OF ALL RESPONSES TO PHQ QUESTIONS 1-9: 0

## 2022-05-03 NOTE — PROGRESS NOTES
Elisa Alejandro (:  1960) is a 64 y.o. male,Established patient, here for evaluation of the following chief complaint(s):  Rash         ASSESSMENT/PLAN:  1. Rash and other nonspecific skin eruption  -     nystatin (MYCOSTATIN) 693799 UNIT/GM powder; Apply 3 times daily. , Disp-30 g, R-0, Normal  -     fluconazole (DIFLUCAN) 150 MG tablet; Take 1 tablet by mouth every 72 hours for 4 doses, Disp-4 tablet, R-0Normal  2. MRSA infection  -     fluconazole (DIFLUCAN) 150 MG tablet; Take 1 tablet by mouth every 72 hours for 4 doses, Disp-4 tablet, R-0Normal  3. Type 2 diabetes mellitus without complication, without long-term current use of insulin (HCC)  -     POCT glycosylated hemoglobin (Hb A1C)      Return in about 6 months (around 11/3/2022), or if symptoms worsen or fail to improve. Subjective   SUBJECTIVE/OBJECTIVE:  Presents to office today for follow up regarding recent rash as well as follow up. Rash is improving significantly. Much less red. Not itching any longer. Will continue antibiotic and antifungals to ensure completely resolved. BP is much improved as well with recent med change. a1c is also improved down to 8.8 today. Feels good. Reports a good appetite, drinking fluids. Normal bowel and bladder pattern. Sleeping ok. Mood is stable. Review of Systems   Constitutional: Negative for activity change, appetite change, fatigue and fever. HENT: Negative for congestion, ear pain, postnasal drip, rhinorrhea and sore throat. Eyes: Negative for discharge, redness, itching and visual disturbance. Wears glasses   Respiratory: Negative for cough, chest tightness, shortness of breath and wheezing. Cardiovascular: Negative for chest pain and palpitations. Gastrointestinal: Negative for abdominal distention, abdominal pain, constipation, diarrhea, nausea and vomiting. Endocrine: Negative for polydipsia, polyphagia and polyuria.    Genitourinary: Negative for difficulty urinating, dysuria and frequency. Musculoskeletal: Negative for arthralgias and myalgias. Skin: Positive for rash (in groin but improving). Allergic/Immunologic:        NKA   Neurological: Negative for dizziness, light-headedness and headaches. Psychiatric/Behavioral: Negative for dysphoric mood and sleep disturbance. The patient is not nervous/anxious. Objective   Physical Exam  Vitals and nursing note reviewed. Constitutional:       General: He is not in acute distress. Appearance: He is not ill-appearing. HENT:      Head: Normocephalic. Nose: Nose normal.      Mouth/Throat:      Lips: Pink. Pulmonary:      Effort: Pulmonary effort is normal. No respiratory distress. Neurological:      Mental Status: He is alert and oriented to person, place, and time. Psychiatric:         Attention and Perception: Attention normal.         Speech: Speech normal.         Behavior: Behavior is cooperative. An electronic signature was used to authenticate this note.     --VIVEK Dumont NP

## 2022-05-06 RX ORDER — FLUCONAZOLE 150 MG/1
150 TABLET ORAL
Qty: 4 TABLET | Refills: 0
Start: 2022-05-06 | End: 2022-05-16

## 2022-05-16 ASSESSMENT — ENCOUNTER SYMPTOMS
COUGH: 0
SORE THROAT: 0
CHEST TIGHTNESS: 0
NAUSEA: 0
EYE REDNESS: 0
EYE DISCHARGE: 0
EYE ITCHING: 0
ABDOMINAL DISTENTION: 0
VOMITING: 0
RHINORRHEA: 0
WHEEZING: 0
SHORTNESS OF BREATH: 0
ABDOMINAL PAIN: 0
ALLERGIC/IMMUNOLOGIC COMMENTS: NKA
CONSTIPATION: 0
DIARRHEA: 0

## 2022-05-18 DIAGNOSIS — E11.9 TYPE 2 DIABETES MELLITUS WITHOUT COMPLICATION, WITHOUT LONG-TERM CURRENT USE OF INSULIN (HCC): ICD-10-CM

## 2022-05-18 RX ORDER — SITAGLIPTIN 100 MG/1
TABLET, FILM COATED ORAL
Qty: 90 TABLET | Refills: 1 | Status: SHIPPED | OUTPATIENT
Start: 2022-05-18

## 2022-05-18 NOTE — TELEPHONE ENCOUNTER
Last visit: 05/03/22  Last Med refill: 10/20/21  Does patient have enough medication for 72 hours: yes    Next Visit Date:  No future appointments. Health Maintenance   Topic Date Due    Colorectal Cancer Screen  Never done    Diabetic foot exam  05/19/2022    DTaP/Tdap/Td vaccine (2 - Td or Tdap) 05/19/2022 (Originally 5/28/2020)    Shingles vaccine (1 of 2) 05/19/2022 (Originally 7/4/2010)    Pneumococcal 0-64 years Vaccine (1 - PCV) 05/19/2022 (Originally 7/4/1966)    COVID-19 Vaccine (1) 05/19/2022 (Originally 7/4/1965)    Diabetic retinal exam  05/29/2022 (Originally 7/4/1978)    Flu vaccine (Season Ended) 10/08/2022 (Originally 9/1/2022)    Diabetic microalbuminuria test  10/19/2022    Lipids  10/19/2022    A1C test (Diabetic or Prediabetic)  05/03/2023    Depression Screen  05/03/2023    Hepatitis A vaccine  Aged Out    Hib vaccine  Aged Out    Meningococcal (ACWY) vaccine  Aged Out    Hepatitis C screen  Discontinued    HIV screen  Discontinued       Hemoglobin A1C (%)   Date Value   05/03/2022 8.8   10/19/2021 10.3 (H)   05/19/2021 8.9             ( goal A1C is < 7)   Microalb/Crt.  Ratio (mcg/mg creat)   Date Value   10/19/2021 CANNOT BE CALCULATED     LDL Cholesterol (mg/dL)   Date Value   10/19/2021 128   05/28/2019 96       (goal LDL is <100)   AST (U/L)   Date Value   10/19/2021 14     ALT (U/L)   Date Value   10/19/2021 16     BUN (mg/dL)   Date Value   10/19/2021 23     BP Readings from Last 3 Encounters:   05/03/22 137/82   04/25/22 (!) 158/100   03/31/22 126/84          (goal 120/80)    All Future Testing planned in [facility]  Lab Frequency Next Occurrence   Cologuard (For External Results Only) Once 05/19/2022               Patient Active Problem List:     Type 2 diabetes mellitus without complication (HCC)     MRSA infection     Hypertension     Other hyperlipidemia     Neck swelling     Rash of neck     Rash and other nonspecific skin eruption

## 2022-05-26 DIAGNOSIS — R21 RASH AND OTHER NONSPECIFIC SKIN ERUPTION: ICD-10-CM

## 2022-05-26 NOTE — TELEPHONE ENCOUNTER
LOV: 05/03/2022  LRF: 05/03/2022  RTO[de-identified] none    Health Maintenance   Topic Date Due    COVID-19 Vaccine (1) Never done    Pneumococcal 0-64 years Vaccine (1 - PCV) Never done    Colorectal Cancer Screen  Never done    Shingles vaccine (1 of 2) Never done    DTaP/Tdap/Td vaccine (2 - Td or Tdap) 05/28/2020    Diabetic foot exam  05/19/2022    Diabetic retinal exam  05/29/2022 (Originally 7/4/1978)    Flu vaccine (Season Ended) 10/08/2022 (Originally 9/1/2022)    Diabetic microalbuminuria test  10/19/2022    Lipids  10/19/2022    Prostate Specific Antigen (PSA) Screening or Monitoring  10/19/2022    A1C test (Diabetic or Prediabetic)  05/03/2023    Depression Screen  05/03/2023    Hepatitis A vaccine  Aged Out    Hib vaccine  Aged Out    Meningococcal (ACWY) vaccine  Aged Out    Hepatitis C screen  Discontinued    HIV screen  Discontinued             (applicable per patient's age: Cancer Screenings, Depression Screening, Fall Risk Screening, Immunizations)    Hemoglobin A1C (%)   Date Value   05/03/2022 8.8   10/19/2021 10.3 (H)   05/19/2021 8.9     Microalb/Crt. Ratio (mcg/mg creat)   Date Value   10/19/2021 CANNOT BE CALCULATED     LDL Cholesterol (mg/dL)   Date Value   10/19/2021 128     AST (U/L)   Date Value   10/19/2021 14     ALT (U/L)   Date Value   10/19/2021 16     BUN (mg/dL)   Date Value   10/19/2021 23      (goal A1C is < 7)   (goal LDL is <100) need 30-50% reduction from baseline     BP Readings from Last 3 Encounters:   05/03/22 137/82   04/25/22 (!) 158/100   03/31/22 126/84    (goal /80)      All Future Testing planned in [facility]:  Lab Frequency Next Occurrence       Next Visit Date:  No future appointments.          Patient Active Problem List:     Type 2 diabetes mellitus without complication (HCC)     MRSA infection     Hypertension     Other hyperlipidemia     Neck swelling     Rash of neck     Rash and other nonspecific skin eruption

## 2022-05-27 RX ORDER — NYSTATIN 100000 [USP'U]/G
POWDER TOPICAL
Qty: 30 G | Refills: 0 | Status: SHIPPED | OUTPATIENT
Start: 2022-05-27 | End: 2022-06-21

## 2022-06-12 DIAGNOSIS — E11.9 TYPE 2 DIABETES MELLITUS WITHOUT COMPLICATION, WITHOUT LONG-TERM CURRENT USE OF INSULIN (HCC): ICD-10-CM

## 2022-06-13 NOTE — TELEPHONE ENCOUNTER
Last visit: 5/3/22  Last Med refill:3/14/22  Does patient have enough medication for 72 hours: Yes    Next Visit Date:  No future appointments. Health Maintenance   Topic Date Due    COVID-19 Vaccine (1) Never done    Pneumococcal 0-64 years Vaccine (1 - PCV) Never done    Diabetic retinal exam  Never done    Colorectal Cancer Screen  Never done    Shingles vaccine (1 of 2) Never done    DTaP/Tdap/Td vaccine (2 - Td or Tdap) 05/28/2020    Diabetic foot exam  05/19/2022    Flu vaccine (Season Ended) 10/08/2022 (Originally 9/1/2022)    Diabetic microalbuminuria test  10/19/2022    Lipids  10/19/2022    Prostate Specific Antigen (PSA) Screening or Monitoring  10/19/2022    A1C test (Diabetic or Prediabetic)  05/03/2023    Depression Screen  05/03/2023    Hepatitis A vaccine  Aged Out    Hib vaccine  Aged Out    Meningococcal (ACWY) vaccine  Aged Out    Hepatitis C screen  Discontinued    HIV screen  Discontinued       Hemoglobin A1C (%)   Date Value   05/03/2022 8.8   10/19/2021 10.3 (H)   05/19/2021 8.9             ( goal A1C is < 7)   Microalb/Crt.  Ratio (mcg/mg creat)   Date Value   10/19/2021 CANNOT BE CALCULATED     LDL Cholesterol (mg/dL)   Date Value   10/19/2021 128   05/28/2019 96       (goal LDL is <100)   AST (U/L)   Date Value   10/19/2021 14     ALT (U/L)   Date Value   10/19/2021 16     BUN (mg/dL)   Date Value   10/19/2021 23     BP Readings from Last 3 Encounters:   05/03/22 137/82   04/25/22 (!) 158/100   03/31/22 126/84          (goal 120/80)    All Future Testing planned in [facility]  Lab Frequency Next Occurrence               Patient Active Problem List:     Type 2 diabetes mellitus without complication (HCC)     MRSA infection     Hypertension     Other hyperlipidemia     Neck swelling     Rash of neck     Rash and other nonspecific skin eruption

## 2022-06-21 DIAGNOSIS — R21 RASH AND OTHER NONSPECIFIC SKIN ERUPTION: ICD-10-CM

## 2022-06-21 DIAGNOSIS — A49.02 MRSA INFECTION: ICD-10-CM

## 2022-06-21 RX ORDER — DOXYCYCLINE HYCLATE 100 MG
100 TABLET ORAL 2 TIMES DAILY
Qty: 42 TABLET | Refills: 0 | Status: SHIPPED | OUTPATIENT
Start: 2022-06-21

## 2022-06-21 RX ORDER — NYSTATIN 100000 [USP'U]/G
POWDER TOPICAL
Qty: 30 G | Refills: 0 | Status: SHIPPED | OUTPATIENT
Start: 2022-06-21 | End: 2022-07-19 | Stop reason: SDUPTHER

## 2022-06-21 RX ORDER — FLUCONAZOLE 150 MG/1
TABLET ORAL
Qty: 4 TABLET | Refills: 0 | Status: SHIPPED | OUTPATIENT
Start: 2022-06-21 | End: 2022-07-19 | Stop reason: SDUPTHER

## 2022-06-21 NOTE — TELEPHONE ENCOUNTER
LOV 05/03/2022  LRF Doxycycline-04/25/2022; Fluconazole-05/05/2022; nyamyc-05/27/2022  RTO none  Health Maintenance   Topic Date Due    COVID-19 Vaccine (1) Never done    Pneumococcal 0-64 years Vaccine (1 - PCV) Never done    Diabetic retinal exam  Never done    Colorectal Cancer Screen  Never done    Shingles vaccine (1 of 2) Never done    DTaP/Tdap/Td vaccine (2 - Td or Tdap) 05/28/2020    Diabetic foot exam  05/19/2022    Flu vaccine (Season Ended) 10/08/2022 (Originally 9/1/2022)    Diabetic microalbuminuria test  10/19/2022    Lipids  10/19/2022    Prostate Specific Antigen (PSA) Screening or Monitoring  10/19/2022    A1C test (Diabetic or Prediabetic)  05/03/2023    Depression Screen  05/03/2023    Hepatitis A vaccine  Aged Out    Hib vaccine  Aged Out    Meningococcal (ACWY) vaccine  Aged Out    Hepatitis C screen  Discontinued    HIV screen  Discontinued             (applicable per patient's age: Cancer Screenings, Depression Screening, Fall Risk Screening, Immunizations)    Hemoglobin A1C (%)   Date Value   05/03/2022 8.8   10/19/2021 10.3 (H)   05/19/2021 8.9     Microalb/Crt. Ratio (mcg/mg creat)   Date Value   10/19/2021 CANNOT BE CALCULATED     LDL Cholesterol (mg/dL)   Date Value   10/19/2021 128     AST (U/L)   Date Value   10/19/2021 14     ALT (U/L)   Date Value   10/19/2021 16     BUN (mg/dL)   Date Value   10/19/2021 23      (goal A1C is < 7)   (goal LDL is <100) need 30-50% reduction from baseline     BP Readings from Last 3 Encounters:   05/03/22 137/82   04/25/22 (!) 158/100   03/31/22 126/84    (goal /80)      All Future Testing planned in [facility]:  Lab Frequency Next Occurrence       Next Visit Date:  No future appointments.          Patient Active Problem List:     Type 2 diabetes mellitus without complication (HCC)     MRSA infection     Hypertension     Other hyperlipidemia     Neck swelling     Rash of neck     Rash and other nonspecific skin eruption

## 2022-07-15 RX ORDER — KETOCONAZOLE 20 MG/G
CREAM TOPICAL
Qty: 60 G | Refills: 3 | OUTPATIENT
Start: 2022-07-15

## 2022-07-15 NOTE — TELEPHONE ENCOUNTER
Last visit: 3/31/22  Last Med refill: 3/31/22  Does patient have enough medication for 72 hours: Yes    Next Visit Date:  No future appointments. Health Maintenance   Topic Date Due    COVID-19 Vaccine (1) Never done    Pneumococcal 0-64 years Vaccine (1 - PCV) Never done    Diabetic retinal exam  Never done    Colorectal Cancer Screen  Never done    Shingles vaccine (1 of 2) Never done    DTaP/Tdap/Td vaccine (2 - Td or Tdap) 05/28/2020    Diabetic foot exam  05/19/2022    Flu vaccine (1) 09/01/2022    Diabetic microalbuminuria test  10/19/2022    Lipids  10/19/2022    Prostate Specific Antigen (PSA) Screening or Monitoring  10/19/2022    A1C test (Diabetic or Prediabetic)  05/03/2023    Depression Screen  05/03/2023    Hepatitis A vaccine  Aged Out    Hib vaccine  Aged Out    Meningococcal (ACWY) vaccine  Aged Out    Hepatitis C screen  Discontinued    HIV screen  Discontinued       Hemoglobin A1C (%)   Date Value   05/03/2022 8.8   10/19/2021 10.3 (H)   05/19/2021 8.9             ( goal A1C is < 7)   Microalb/Crt.  Ratio (mcg/mg creat)   Date Value   10/19/2021 CANNOT BE CALCULATED     LDL Cholesterol (mg/dL)   Date Value   10/19/2021 128   05/28/2019 96       (goal LDL is <100)   AST (U/L)   Date Value   10/19/2021 14     ALT (U/L)   Date Value   10/19/2021 16     BUN (mg/dL)   Date Value   10/19/2021 23     BP Readings from Last 3 Encounters:   05/03/22 137/82   04/25/22 (!) 158/100   03/31/22 126/84          (goal 120/80)    All Future Testing planned in [facility]  Lab Frequency Next Occurrence               Patient Active Problem List:     Type 2 diabetes mellitus without complication (HCC)     MRSA infection     Hypertension     Other hyperlipidemia     Neck swelling     Rash of neck     Rash and other nonspecific skin eruption

## 2022-07-19 DIAGNOSIS — A49.02 MRSA INFECTION: ICD-10-CM

## 2022-07-19 DIAGNOSIS — R21 RASH AND OTHER NONSPECIFIC SKIN ERUPTION: ICD-10-CM

## 2022-07-19 RX ORDER — FLUCONAZOLE 150 MG/1
TABLET ORAL
Qty: 4 TABLET | Refills: 0 | Status: SHIPPED | OUTPATIENT
Start: 2022-07-19

## 2022-07-19 RX ORDER — NYSTATIN 100000 [USP'U]/G
POWDER TOPICAL
Qty: 30 G | Refills: 0 | Status: SHIPPED | OUTPATIENT
Start: 2022-07-19 | End: 2022-08-02

## 2022-07-19 NOTE — TELEPHONE ENCOUNTER
LOv  5/3/22  LRF 6/21/22    Health Maintenance   Topic Date Due    COVID-19 Vaccine (1) Never done    Pneumococcal 0-64 years Vaccine (1 - PCV) Never done    Diabetic retinal exam  Never done    Colorectal Cancer Screen  Never done    Shingles vaccine (1 of 2) Never done    DTaP/Tdap/Td vaccine (2 - Td or Tdap) 05/28/2020    Diabetic foot exam  05/19/2022    Flu vaccine (1) 09/01/2022    Diabetic microalbuminuria test  10/19/2022    Lipids  10/19/2022    Prostate Specific Antigen (PSA) Screening or Monitoring  10/19/2022    A1C test (Diabetic or Prediabetic)  05/03/2023    Depression Screen  05/03/2023    Hepatitis A vaccine  Aged Out    Hib vaccine  Aged Out    Meningococcal (ACWY) vaccine  Aged Out    Hepatitis C screen  Discontinued    HIV screen  Discontinued             (applicable per patient's age: Cancer Screenings, Depression Screening, Fall Risk Screening, Immunizations)    Hemoglobin A1C (%)   Date Value   05/03/2022 8.8   10/19/2021 10.3 (H)   05/19/2021 8.9     Microalb/Crt. Ratio (mcg/mg creat)   Date Value   10/19/2021 CANNOT BE CALCULATED     LDL Cholesterol (mg/dL)   Date Value   10/19/2021 128     AST (U/L)   Date Value   10/19/2021 14     ALT (U/L)   Date Value   10/19/2021 16     BUN (mg/dL)   Date Value   10/19/2021 23      (goal A1C is < 7)   (goal LDL is <100) need 30-50% reduction from baseline     BP Readings from Last 3 Encounters:   05/03/22 137/82   04/25/22 (!) 158/100   03/31/22 126/84    (goal /80)      All Future Testing planned in [facility]:  Lab Frequency Next Occurrence       Next Visit Date:  No future appointments.          Patient Active Problem List:     Type 2 diabetes mellitus without complication (HCC)     MRSA infection     Hypertension     Other hyperlipidemia     Neck swelling     Rash of neck     Rash and other nonspecific skin eruption

## 2022-07-29 DIAGNOSIS — R21 RASH AND OTHER NONSPECIFIC SKIN ERUPTION: ICD-10-CM

## 2022-08-02 RX ORDER — NYSTATIN 100000 [USP'U]/G
POWDER TOPICAL
Qty: 30 G | Refills: 3 | Status: SHIPPED | OUTPATIENT
Start: 2022-08-02

## 2022-10-05 RX ORDER — KETOCONAZOLE 20 MG/G
CREAM TOPICAL
Qty: 60 G | Refills: 3 | OUTPATIENT
Start: 2022-10-05

## 2022-10-10 NOTE — TELEPHONE ENCOUNTER
Last visit: 5/3/22  Last Med refill: 3/31/22  Does patient have enough medication for 72 hours: Yes    Next Visit Date:  No future appointments. Health Maintenance   Topic Date Due    COVID-19 Vaccine (1) Never done    Pneumococcal 0-64 years Vaccine (1 - PCV) Never done    Diabetic retinal exam  Never done    Colorectal Cancer Screen  Never done    Shingles vaccine (1 of 2) Never done    DTaP/Tdap/Td vaccine (2 - Td or Tdap) 05/28/2020    Diabetic foot exam  05/19/2022    Flu vaccine (1) Never done    Diabetic microalbuminuria test  10/19/2022    Lipids  10/19/2022    A1C test (Diabetic or Prediabetic)  05/03/2023    Depression Screen  05/03/2023    Hepatitis A vaccine  Aged Out    Hib vaccine  Aged Out    Meningococcal (ACWY) vaccine  Aged Out    Hepatitis C screen  Discontinued    HIV screen  Discontinued       Hemoglobin A1C (%)   Date Value   05/03/2022 8.8   10/19/2021 10.3 (H)   05/19/2021 8.9             ( goal A1C is < 7)   Microalb/Crt.  Ratio (mcg/mg creat)   Date Value   10/19/2021 CANNOT BE CALCULATED     LDL Cholesterol (mg/dL)   Date Value   10/19/2021 128   05/28/2019 96       (goal LDL is <100)   AST (U/L)   Date Value   10/19/2021 14     ALT (U/L)   Date Value   10/19/2021 16     BUN (mg/dL)   Date Value   10/19/2021 23     BP Readings from Last 3 Encounters:   05/03/22 137/82   04/25/22 (!) 158/100   03/31/22 126/84          (goal 120/80)    All Future Testing planned in [facility]  Lab Frequency Next Occurrence               Patient Active Problem List:     Type 2 diabetes mellitus without complication (HCC)     MRSA infection     Hypertension     Other hyperlipidemia     Neck swelling     Rash of neck     Rash and other nonspecific skin eruption

## 2022-10-11 RX ORDER — KETOCONAZOLE 20 MG/G
CREAM TOPICAL
Qty: 60 G | Refills: 1 | Status: SHIPPED | OUTPATIENT
Start: 2022-10-11

## 2022-11-03 DIAGNOSIS — I10 ESSENTIAL HYPERTENSION: ICD-10-CM

## 2022-11-03 NOTE — TELEPHONE ENCOUNTER
Last visit: 5/3/22  Last Med refill: 4/19/22  Does patient have enough medication for 72 hours: Yes    Next Visit Date:  No future appointments. Health Maintenance   Topic Date Due    COVID-19 Vaccine (1) Never done    Pneumococcal 0-64 years Vaccine (1 - PCV) Never done    Diabetic retinal exam  Never done    Colorectal Cancer Screen  Never done    Shingles vaccine (1 of 2) Never done    DTaP/Tdap/Td vaccine (2 - Td or Tdap) 05/28/2020    Diabetic foot exam  05/19/2022    Flu vaccine (1) Never done    Diabetic microalbuminuria test  10/19/2022    Lipids  10/19/2022    A1C test (Diabetic or Prediabetic)  05/03/2023    Depression Screen  05/03/2023    Hepatitis A vaccine  Aged Out    Hib vaccine  Aged Out    Meningococcal (ACWY) vaccine  Aged Out    Hepatitis C screen  Discontinued    HIV screen  Discontinued       Hemoglobin A1C (%)   Date Value   05/03/2022 8.8   10/19/2021 10.3 (H)   05/19/2021 8.9             ( goal A1C is < 7)   Microalb/Crt.  Ratio (mcg/mg creat)   Date Value   10/19/2021 CANNOT BE CALCULATED     LDL Cholesterol (mg/dL)   Date Value   10/19/2021 128   05/28/2019 96       (goal LDL is <100)   AST (U/L)   Date Value   10/19/2021 14     ALT (U/L)   Date Value   10/19/2021 16     BUN (mg/dL)   Date Value   10/19/2021 23     BP Readings from Last 3 Encounters:   05/03/22 137/82   04/25/22 (!) 158/100   03/31/22 126/84          (goal 120/80)    All Future Testing planned in [facility]  Lab Frequency Next Occurrence               Patient Active Problem List:     Type 2 diabetes mellitus without complication (HCC)     MRSA infection     Hypertension     Other hyperlipidemia     Neck swelling     Rash of neck     Rash and other nonspecific skin eruption

## 2022-11-04 DIAGNOSIS — E11.9 TYPE 2 DIABETES MELLITUS WITHOUT COMPLICATION, WITHOUT LONG-TERM CURRENT USE OF INSULIN (HCC): ICD-10-CM

## 2022-11-04 RX ORDER — LISINOPRIL AND HYDROCHLOROTHIAZIDE 12.5; 1 MG/1; MG/1
TABLET ORAL
Qty: 90 TABLET | Refills: 1 | Status: SHIPPED | OUTPATIENT
Start: 2022-11-04

## 2022-11-04 NOTE — TELEPHONE ENCOUNTER
Last visit: 5/3/22  Last Med refill: 6/14/22  Does patient have enough medication for 72 hours: No: pt does not have anymore after today    Next Visit Date:  Future Appointments   Date Time Provider Selena Gutierrez   12/27/2022  9:45 AM VIVEK Marcano NP [address] Maintenance   Topic Date Due    COVID-19 Vaccine (1) Never done    Pneumococcal 0-64 years Vaccine (1 - PCV) Never done    Diabetic retinal exam  Never done    Colorectal Cancer Screen  Never done    Shingles vaccine (1 of 2) Never done    DTaP/Tdap/Td vaccine (2 - Td or Tdap) 05/28/2020    Diabetic foot exam  05/19/2022    Flu vaccine (1) Never done    Diabetic microalbuminuria test  10/19/2022    Lipids  10/19/2022    A1C test (Diabetic or Prediabetic)  05/03/2023    Depression Screen  05/03/2023    Hepatitis A vaccine  Aged Out    Hib vaccine  Aged Out    Meningococcal (ACWY) vaccine  Aged Out    Hepatitis C screen  Discontinued    HIV screen  Discontinued       Hemoglobin A1C (%)   Date Value   05/03/2022 8.8   10/19/2021 10.3 (H)   05/19/2021 8.9             ( goal A1C is < 7)   Microalb/Crt.  Ratio (mcg/mg creat)   Date Value   10/19/2021 CANNOT BE CALCULATED     LDL Cholesterol (mg/dL)   Date Value   10/19/2021 128   05/28/2019 96       (goal LDL is <100)   AST (U/L)   Date Value   10/19/2021 14     ALT (U/L)   Date Value   10/19/2021 16     BUN (mg/dL)   Date Value   10/19/2021 23     BP Readings from Last 3 Encounters:   05/03/22 137/82   04/25/22 (!) 158/100   03/31/22 126/84          (goal 120/80)    All Future Testing planned in [facility]  Lab Frequency Next Occurrence               Patient Active Problem List:     Type 2 diabetes mellitus without complication (HCC)     MRSA infection     Hypertension     Other hyperlipidemia     Neck swelling     Rash of neck     Rash and other nonspecific skin eruption

## 2022-11-13 DIAGNOSIS — E11.9 TYPE 2 DIABETES MELLITUS WITHOUT COMPLICATION, WITHOUT LONG-TERM CURRENT USE OF INSULIN (HCC): ICD-10-CM

## 2022-11-14 NOTE — TELEPHONE ENCOUNTER
LOV 5/3/22  LRF 5/18/22  RTO 6 months,     Health Maintenance   Topic Date Due    COVID-19 Vaccine (1) Never done    Pneumococcal 0-64 years Vaccine (1 - PCV) Never done    Diabetic retinal exam  Never done    Colorectal Cancer Screen  Never done    Shingles vaccine (1 of 2) Never done    DTaP/Tdap/Td vaccine (2 - Td or Tdap) 05/28/2020    Diabetic foot exam  05/19/2022    Flu vaccine (1) Never done    Diabetic microalbuminuria test  10/19/2022    Lipids  10/19/2022    A1C test (Diabetic or Prediabetic)  05/03/2023    Depression Screen  05/03/2023    Hepatitis A vaccine  Aged Out    Hib vaccine  Aged Out    Meningococcal (ACWY) vaccine  Aged Out    Hepatitis C screen  Discontinued    HIV screen  Discontinued             (applicable per patient's age: Cancer Screenings, Depression Screening, Fall Risk Screening, Immunizations)    Hemoglobin A1C (%)   Date Value   05/03/2022 8.8   10/19/2021 10.3 (H)   05/19/2021 8.9     Microalb/Crt.  Ratio (mcg/mg creat)   Date Value   10/19/2021 CANNOT BE CALCULATED     LDL Cholesterol (mg/dL)   Date Value   10/19/2021 128     AST (U/L)   Date Value   10/19/2021 14     ALT (U/L)   Date Value   10/19/2021 16     BUN (mg/dL)   Date Value   10/19/2021 23      (goal A1C is < 7)   (goal LDL is <100) need 30-50% reduction from baseline     BP Readings from Last 3 Encounters:   05/03/22 137/82   04/25/22 (!) 158/100   03/31/22 126/84    (goal /80)      All Future Testing planned in [facility]:  Lab Frequency Next Occurrence       Next Visit Date:  Future Appointments   Date Time Provider Selena Gutierrez   12/27/2022  9:45 AM Anand Benjamin APRN - NP [address] [facility]            Patient Active Problem List:     Type 2 diabetes mellitus without complication ([facility] [address]     MRSA infection     Hypertension     Other hyperlipidemia     Neck swelling     Rash of neck     Rash and other nonspecific skin eruption

## 2022-11-20 DIAGNOSIS — E78.2 MIXED HYPERLIPIDEMIA: ICD-10-CM

## 2022-11-21 NOTE — TELEPHONE ENCOUNTER
Last visit: 5/3/22  Last Med refill: 4/19/22  Does patient have enough medication for 72 hours: Yes    Next Visit Date:  Future Appointments   Date Time Provider Selena Gutierrez   12/27/2022  9:45 AM VIVEK Olivas NP [address] Maintenance   Topic Date Due    COVID-19 Vaccine (1) Never done    Pneumococcal 0-64 years Vaccine (1 - PCV) Never done    Diabetic retinal exam  Never done    Colorectal Cancer Screen  Never done    Shingles vaccine (1 of 2) Never done    DTaP/Tdap/Td vaccine (2 - Td or Tdap) 05/28/2020    Diabetic foot exam  05/19/2022    Flu vaccine (1) Never done    Diabetic microalbuminuria test  10/19/2022    Lipids  10/19/2022    A1C test (Diabetic or Prediabetic)  05/03/2023    Depression Screen  05/03/2023    Hepatitis A vaccine  Aged Out    Hib vaccine  Aged Out    Meningococcal (ACWY) vaccine  Aged Out    Hepatitis C screen  Discontinued    HIV screen  Discontinued       Hemoglobin A1C (%)   Date Value   05/03/2022 8.8   10/19/2021 10.3 (H)   05/19/2021 8.9             ( goal A1C is < 7)   Microalb/Crt.  Ratio (mcg/mg creat)   Date Value   10/19/2021 CANNOT BE CALCULATED     LDL Cholesterol (mg/dL)   Date Value   10/19/2021 128   05/28/2019 96       (goal LDL is <100)   AST (U/L)   Date Value   10/19/2021 14     ALT (U/L)   Date Value   10/19/2021 16     BUN (mg/dL)   Date Value   10/19/2021 23     BP Readings from Last 3 Encounters:   05/03/22 137/82   04/25/22 (!) 158/100   03/31/22 126/84          (goal 120/80)    All Future Testing planned in [facility]  Lab Frequency Next Occurrence               Patient Active Problem List:     Type 2 diabetes mellitus without complication (HCC)     MRSA infection     Hypertension     Other hyperlipidemia     Neck swelling     Rash of neck     Rash and other nonspecific skin eruption

## 2022-11-22 RX ORDER — ATORVASTATIN CALCIUM 20 MG/1
20 TABLET, FILM COATED ORAL DAILY
Qty: 90 TABLET | Refills: 1 | Status: SHIPPED | OUTPATIENT
Start: 2022-11-22 | End: 2023-11-22

## 2023-01-05 ENCOUNTER — OFFICE VISIT (OUTPATIENT)
Dept: PRIMARY CARE CLINIC | Age: 63
End: 2023-01-05
Payer: COMMERCIAL

## 2023-01-05 VITALS
DIASTOLIC BLOOD PRESSURE: 86 MMHG | OXYGEN SATURATION: 96 % | HEART RATE: 106 BPM | WEIGHT: 234 LBS | TEMPERATURE: 98.3 F | BODY MASS INDEX: 29.25 KG/M2 | SYSTOLIC BLOOD PRESSURE: 124 MMHG

## 2023-01-05 DIAGNOSIS — B37.2 CANDIDAL SKIN INFECTION: Primary | ICD-10-CM

## 2023-01-05 PROCEDURE — 3074F SYST BP LT 130 MM HG: CPT | Performed by: PHYSICIAN ASSISTANT

## 2023-01-05 PROCEDURE — 99213 OFFICE O/P EST LOW 20 MIN: CPT | Performed by: PHYSICIAN ASSISTANT

## 2023-01-05 PROCEDURE — 3079F DIAST BP 80-89 MM HG: CPT | Performed by: PHYSICIAN ASSISTANT

## 2023-01-05 RX ORDER — NYSTATIN 100000 [USP'U]/G
POWDER TOPICAL
Qty: 1 EACH | Refills: 0 | Status: SHIPPED | OUTPATIENT
Start: 2023-01-05

## 2023-01-05 RX ORDER — FLUCONAZOLE 100 MG/1
100 TABLET ORAL DAILY
Qty: 2 TABLET | Refills: 0 | Status: SHIPPED | OUTPATIENT
Start: 2023-01-05 | End: 2023-01-07

## 2023-01-05 ASSESSMENT — ENCOUNTER SYMPTOMS
GASTROINTESTINAL NEGATIVE: 1
RESPIRATORY NEGATIVE: 1

## 2023-01-05 NOTE — PROGRESS NOTES
[place] [address]  [address] [facility]  Phone: [phone]  Fax: Mateo Mo    Pt Name: Johnny Ramirez  MRN: [id number]  Daysigfarslan 1960  Date of evaluation: 1/5/2023  Provider: Ha Bai PA-C     CHIEF COMPLAINT       Chief Complaint   Patient presents with    Rash     Bubbly rash on scrotum. Trying to keep things dry and he has used antifungal cream. Helped with the itch, but didn't help prevent it from spreading. Has a hx of diabetes. HISTORY OF PRESENT ILLNESS  (Location/Symptom, Timing/Onset, Context/Setting, Quality, Duration, Modifying Factors, Severity.)   Johnny Ramirez is a 58 y.o. White (non-) [1] male who presents to the office for evaluation of      Rash  This is a new problem. The current episode started in the past 7 days. The affected locations include the groin and genitalia. The rash is characterized by burning, redness and itchiness. He was exposed to nothing. Pertinent negatives include no fatigue or fever. Nursing Notes were reviewed. REVIEW OF SYSTEMS    (2-9 systems for level 4, 10 or more for level 5)     Review of Systems   Constitutional:  Negative for chills, diaphoresis, fatigue and fever. HENT: Negative. Respiratory: Negative. Cardiovascular: Negative. Gastrointestinal: Negative. Genitourinary: Negative. Musculoskeletal: Negative. Skin:  Positive for rash. Except as noted above the remainder of the review of systems was reviewed andnegative. PAST MEDICAL HISTORY   History reviewed. Past Medical History:   Diagnosis Date    Hyperlipemia     Hyperlipidemia     Hypertension     MRSA infection     Type 2 diabetes mellitus without complication (HCC)     Wears glasses          SURGICAL HISTORY     History reviewed.     Past Surgical History:   Procedure Laterality Date    HIATAL HERNIA REPAIR  1965    right side    TONSILLECTOMY      TUMOR REMOVAL Right     Neck benign WRIST SURGERY      torn ligament         CURRENT MEDICATIONS       Current Outpatient Medications   Medication Sig Dispense Refill    fluconazole (DIFLUCAN) 100 MG tablet Take 1 tablet by mouth daily for 2 doses 2 tablet 0    nystatin (MYCOSTATIN) 875661 UNIT/GM powder Apply 3 times daily. 1 each 0    atorvastatin (LIPITOR) 20 MG tablet Take 1 tablet by mouth daily 90 tablet 1    SITagliptin (JANUVIA) 100 MG tablet Take 1 tablet by mouth daily 90 tablet 1    lisinopril-hydroCHLOROthiazide (PRINZIDE;ZESTORETIC) 10-12.5 MG per tablet TAKE ONE TABLET BY MOUTH DAILY 90 tablet 1    metFORMIN (GLUCOPHAGE) 1000 MG tablet Take 1 tablet by mouth 2 times daily (with meals) 180 tablet 1    ketoconazole (NIZORAL) 2 % cream APPLY A THIN LAYER TOPICALLY TWICE A DAY FOR TWO TO FOUR WEEKS (Patient not taking: Reported on 2023) 60 g 1    doxycycline hyclate (VIBRA-TABS) 100 MG tablet Take 1 tablet by mouth 2 times daily (Patient not taking: Reported on 2023) 42 tablet 0    Blood Pressure KIT 1 kit by Does not apply route 2 times daily 1 kit 0     No current facility-administered medications for this visit. ALLERGIES     Patient has no known allergies. FAMILY HISTORY           Problem Relation Age of Onset    Other Mother         Alzheimers -68    High Blood Pressure Father     Diabetes Father     Other Father         Parkinsons    Diabetes Brother     Diabetes Brother     Dementia Mother     Parkinsonism Father      Family Status   Relation Name Status    Mother      Father      Brother  (Not Specified)    Brother  (Not Specified)          SOCIAL HISTORY      reports that he has never smoked. He has never used smokeless tobacco. He reports that he does not currently use alcohol. He reports that he does not use drugs.       PHYSICAL EXAM    (up to 7 for level 4, 8 or more for level 5)     Vitals:    23 1120   BP: 124/86   Site: Left Upper Arm   Position: Sitting   Cuff Size: Medium Adult Pulse: (!) 106   Temp: 98.3 °F (36.8 °C)   SpO2: 96%   Weight: 234 lb (106.1 kg)         Physical Exam  Vitals and nursing note reviewed. Constitutional:       General: He is not in acute distress. Appearance: Normal appearance. HENT:      Head: Normocephalic and atraumatic. Right Ear: External ear normal.      Left Ear: External ear normal.      Nose: Nose normal.      Mouth/Throat:      Mouth: Mucous membranes are moist.   Eyes:      Extraocular Movements: Extraocular movements intact. Conjunctiva/sclera: Conjunctivae normal.      Pupils: Pupils are equal, round, and reactive to light. Abdominal:      Palpations: Abdomen is soft. Skin:     General: Skin is warm and dry. Findings: Rash present. Neurological:      Mental Status: He is alert. DIFFERENTIAL DIAGNOSIS:       Sofya Tao reviewed the disposition diagnosis with the patient and or their family/guardian. I have answered their questions and given discharge instructions. They voiced understanding of these instructions and did not have anyfurther questions or complaints. PROCEDURES:  No orders of the defined types were placed in this encounter. No results found for this visit on 01/05/23. FINALIMPRESSION      Visit Diagnoses and Associated Orders       Candidal skin infection    -  Primary         ORDERS WITHOUT AN ASSOCIATED DIAGNOSIS    fluconazole (DIFLUCAN) 100 MG tablet [02086]      nystatin (MYCOSTATIN) 091545 UNIT/GM powder [41467]                PLAN     Return if symptoms worsen or fail to improve. DISCHARGEMEDICATIONS:  Orders Placed This Encounter   Medications    fluconazole (DIFLUCAN) 100 MG tablet     Sig: Take 1 tablet by mouth daily for 2 doses     Dispense:  2 tablet     Refill:  0    nystatin (MYCOSTATIN) 355001 UNIT/GM powder     Sig: Apply 3 times daily. Dispense:  1 each     Refill:  0           Plan:  1. Keep the rash clean and dry  2. Apply all medication as prescribed  3.  Take all medication as prescribed  4. Patient educated on signs/ symptoms of infection  5. Patient to follow up with PCP or go to the ER if rash persists or gets worse. 6. Patient given educational material  7. Patient understands and is agreeable. Patient instructed to return to the office if symptoms worsen, return, or have any other concerns. Patient understands and is agreeable.          Radha Stinson PA-C 1/5/2023 11:52 AM

## 2023-01-10 DIAGNOSIS — A49.02 MRSA INFECTION: ICD-10-CM

## 2023-01-10 DIAGNOSIS — R21 RASH AND OTHER NONSPECIFIC SKIN ERUPTION: ICD-10-CM

## 2023-01-10 NOTE — TELEPHONE ENCOUNTER
Last visit: 5/3/2022    Last Med refill: 6/21/2022  Does patient have enough medication for 72 hours: Yes    Next Visit Date:  Future Appointments   Date Time Provider Selena Gutierrez   2/2/2023  9:15 AM VIVEK Srivastava NP Jalyn Linder [address] Maintenance   Topic Date Due    COVID-19 Vaccine (1) Never done    Pneumococcal 0-64 years Vaccine (1 - PCV) Never done    Diabetic Alb to Cr ratio (uACR) test  Never done    Diabetic retinal exam  Never done    Colorectal Cancer Screen  Never done    Shingles vaccine (1 of 2) Never done    DTaP/Tdap/Td vaccine (2 - Td or Tdap) 05/28/2020    Diabetic foot exam  05/19/2022    Flu vaccine (1) Never done    Lipids  10/19/2022    GFR test (Diabetes, CKD 3-4, OR last GFR 15-59)  10/19/2022    A1C test (Diabetic or Prediabetic)  05/03/2023    Depression Screen  05/03/2023    Hepatitis A vaccine  Aged Out    Hib vaccine  Aged Out    Meningococcal (ACWY) vaccine  Aged Out    Hepatitis C screen  Discontinued    HIV screen  Discontinued       Hemoglobin A1C (%)   Date Value   05/03/2022 8.8   10/19/2021 10.3 (H)   05/19/2021 8.9             ( goal A1C is < 7)   Microalb/Crt.  Ratio (mcg/mg creat)   Date Value   10/19/2021 CANNOT BE CALCULATED     LDL Cholesterol (mg/dL)   Date Value   10/19/2021 128   05/28/2019 96       (goal LDL is <100)   AST (U/L)   Date Value   10/19/2021 14     ALT (U/L)   Date Value   10/19/2021 16     BUN (mg/dL)   Date Value   10/19/2021 23     BP Readings from Last 3 Encounters:   01/05/23 124/86   05/03/22 137/82   04/25/22 (!) 158/100          (goal 120/80)    All Future Testing planned in [facility]  Lab Frequency Next Occurrence               Patient Active Problem List:     Type 2 diabetes mellitus without complication (HCC)     MRSA infection     Hypertension     Other hyperlipidemia     Neck swelling     Rash of neck     Rash and other nonspecific skin eruption

## 2023-01-11 RX ORDER — DOXYCYCLINE HYCLATE 100 MG
100 TABLET ORAL 2 TIMES DAILY
Qty: 42 TABLET | Refills: 0 | OUTPATIENT
Start: 2023-01-11

## 2023-01-11 RX ORDER — NYSTATIN 100000 [USP'U]/G
POWDER TOPICAL
Qty: 1 EACH | Refills: 0 | OUTPATIENT
Start: 2023-01-11

## 2023-02-02 ENCOUNTER — HOSPITAL ENCOUNTER (OUTPATIENT)
Age: 63
Setting detail: SPECIMEN
Discharge: HOME | End: 2023-02-02

## 2023-02-02 ENCOUNTER — OFFICE VISIT (OUTPATIENT)
Dept: FAMILY MEDICINE CLINIC | Age: 63
End: 2023-02-02
Payer: COMMERCIAL

## 2023-02-02 VITALS
TEMPERATURE: 98.4 F | OXYGEN SATURATION: 99 % | HEIGHT: 75 IN | SYSTOLIC BLOOD PRESSURE: 132 MMHG | DIASTOLIC BLOOD PRESSURE: 78 MMHG | WEIGHT: 224 LBS | BODY MASS INDEX: 27.85 KG/M2 | HEART RATE: 82 BPM

## 2023-02-02 DIAGNOSIS — A49.02 MRSA INFECTION: ICD-10-CM

## 2023-02-02 DIAGNOSIS — Z12.5 PROSTATE CANCER SCREENING: ICD-10-CM

## 2023-02-02 DIAGNOSIS — Z12.11 COLON CANCER SCREENING: ICD-10-CM

## 2023-02-02 DIAGNOSIS — E11.9 TYPE 2 DIABETES MELLITUS WITHOUT COMPLICATION, WITHOUT LONG-TERM CURRENT USE OF INSULIN (HCC): Primary | ICD-10-CM

## 2023-02-02 DIAGNOSIS — B35.1 NAIL FUNGAL INFECTION: ICD-10-CM

## 2023-02-02 DIAGNOSIS — F41.9 ANXIETY: ICD-10-CM

## 2023-02-02 DIAGNOSIS — R21 RASH AND OTHER NONSPECIFIC SKIN ERUPTION: ICD-10-CM

## 2023-02-02 DIAGNOSIS — E11.9 TYPE 2 DIABETES MELLITUS WITHOUT COMPLICATION, WITHOUT LONG-TERM CURRENT USE OF INSULIN (HCC): ICD-10-CM

## 2023-02-02 LAB
ALBUMIN SERPL-MCNC: 4.7 G/DL (ref 3.5–5.2)
ALBUMIN/GLOBULIN RATIO: 1.7 (ref 1–2.5)
ALP SERPL-CCNC: 78 U/L (ref 40–129)
ALT SERPL-CCNC: 17 U/L (ref 5–41)
ANION GAP SERPL CALCULATED.3IONS-SCNC: 12 MMOL/L (ref 9–17)
AST SERPL-CCNC: 15 U/L
BILIRUB SERPL-MCNC: 1.9 MG/DL (ref 0.3–1.2)
BUN SERPL-MCNC: 18 MG/DL (ref 8–23)
CALCIUM SERPL-MCNC: 9.9 MG/DL (ref 8.6–10.4)
CHLORIDE SERPL-SCNC: 91 MMOL/L (ref 98–107)
CHOLEST SERPL-MCNC: 142 MG/DL
CHOLESTEROL/HDL RATIO: 3.4
CO2 SERPL-SCNC: 25 MMOL/L (ref 20–31)
CREAT SERPL-MCNC: 0.82 MG/DL (ref 0.7–1.2)
CREATININE URINE: 158.6 MG/DL (ref 39–259)
EST. AVERAGE GLUCOSE BLD GHB EST-MCNC: 269 MG/DL
GFR SERPL CREATININE-BSD FRML MDRD: >60 ML/MIN/1.73M2
GLUCOSE SERPL-MCNC: 287 MG/DL (ref 70–99)
HBA1C MFR BLD: 11 % (ref 4–6)
HCT VFR BLD AUTO: 50.1 % (ref 40.7–50.3)
HDLC SERPL-MCNC: 42 MG/DL
HGB BLD-MCNC: 16.7 G/DL (ref 13–17)
LDLC SERPL CALC-MCNC: 72 MG/DL (ref 0–130)
MCH RBC QN AUTO: 29.9 PG (ref 25.2–33.5)
MCHC RBC AUTO-ENTMCNC: 33.3 G/DL (ref 28.4–34.8)
MCV RBC AUTO: 89.6 FL (ref 82.6–102.9)
MICROALBUMIN/CREAT 24H UR: 31 MG/L
MICROALBUMIN/CREAT UR-RTO: 20 MCG/MG CREAT
NRBC AUTOMATED: 0 PER 100 WBC
PDW BLD-RTO: 12.4 % (ref 11.8–14.4)
PLATELET # BLD AUTO: 237 K/UL (ref 138–453)
PMV BLD AUTO: 11 FL (ref 8.1–13.5)
POTASSIUM SERPL-SCNC: 3.7 MMOL/L (ref 3.7–5.3)
PROSTATE SPECIFIC ANTIGEN: 1.31 NG/ML
PROT SERPL-MCNC: 7.4 G/DL (ref 6.4–8.3)
RBC # BLD: 5.59 M/UL (ref 4.21–5.77)
SODIUM SERPL-SCNC: 128 MMOL/L (ref 135–144)
TRIGL SERPL-MCNC: 138 MG/DL
WBC # BLD AUTO: 8.9 K/UL (ref 3.5–11.3)

## 2023-02-02 PROCEDURE — 3078F DIAST BP <80 MM HG: CPT | Performed by: NURSE PRACTITIONER

## 2023-02-02 PROCEDURE — 3046F HEMOGLOBIN A1C LEVEL >9.0%: CPT | Performed by: NURSE PRACTITIONER

## 2023-02-02 PROCEDURE — 99214 OFFICE O/P EST MOD 30 MIN: CPT | Performed by: NURSE PRACTITIONER

## 2023-02-02 PROCEDURE — 3074F SYST BP LT 130 MM HG: CPT | Performed by: NURSE PRACTITIONER

## 2023-02-02 RX ORDER — BUSPIRONE HYDROCHLORIDE 10 MG/1
10 TABLET ORAL 2 TIMES DAILY PRN
Qty: 60 TABLET | Refills: 1 | Status: SHIPPED | OUTPATIENT
Start: 2023-02-02 | End: 2023-03-04

## 2023-02-02 RX ORDER — FLUCONAZOLE 150 MG/1
TABLET ORAL
Qty: 1 TABLET | Refills: 1 | Status: SHIPPED | OUTPATIENT
Start: 2023-02-02

## 2023-02-02 SDOH — ECONOMIC STABILITY: HOUSING INSECURITY
IN THE LAST 12 MONTHS, WAS THERE A TIME WHEN YOU DID NOT HAVE A STEADY PLACE TO SLEEP OR SLEPT IN A SHELTER (INCLUDING NOW)?: NO

## 2023-02-02 SDOH — ECONOMIC STABILITY: FOOD INSECURITY: WITHIN THE PAST 12 MONTHS, YOU WORRIED THAT YOUR FOOD WOULD RUN OUT BEFORE YOU GOT MONEY TO BUY MORE.: NEVER TRUE

## 2023-02-02 SDOH — ECONOMIC STABILITY: INCOME INSECURITY: HOW HARD IS IT FOR YOU TO PAY FOR THE VERY BASICS LIKE FOOD, HOUSING, MEDICAL CARE, AND HEATING?: NOT HARD AT ALL

## 2023-02-02 SDOH — ECONOMIC STABILITY: FOOD INSECURITY: WITHIN THE PAST 12 MONTHS, THE FOOD YOU BOUGHT JUST DIDN'T LAST AND YOU DIDN'T HAVE MONEY TO GET MORE.: NEVER TRUE

## 2023-02-02 ASSESSMENT — ANXIETY QUESTIONNAIRES
7. FEELING AFRAID AS IF SOMETHING AWFUL MIGHT HAPPEN: 0
IF YOU CHECKED OFF ANY PROBLEMS ON THIS QUESTIONNAIRE, HOW DIFFICULT HAVE THESE PROBLEMS MADE IT FOR YOU TO DO YOUR WORK, TAKE CARE OF THINGS AT HOME, OR GET ALONG WITH OTHER PEOPLE: NOT DIFFICULT AT ALL
6. BECOMING EASILY ANNOYED OR IRRITABLE: 0
5. BEING SO RESTLESS THAT IT IS HARD TO SIT STILL: 0
1. FEELING NERVOUS, ANXIOUS, OR ON EDGE: 0
4. TROUBLE RELAXING: 0
2. NOT BEING ABLE TO STOP OR CONTROL WORRYING: 0
3. WORRYING TOO MUCH ABOUT DIFFERENT THINGS: 0
GAD7 TOTAL SCORE: 0

## 2023-02-02 ASSESSMENT — PATIENT HEALTH QUESTIONNAIRE - PHQ9
1. LITTLE INTEREST OR PLEASURE IN DOING THINGS: 0
SUM OF ALL RESPONSES TO PHQ QUESTIONS 1-9: 0
SUM OF ALL RESPONSES TO PHQ9 QUESTIONS 1 & 2: 0
2. FEELING DOWN, DEPRESSED OR HOPELESS: 0
SUM OF ALL RESPONSES TO PHQ QUESTIONS 1-9: 0

## 2023-02-02 ASSESSMENT — ENCOUNTER SYMPTOMS
EYE REDNESS: 0
EYE DISCHARGE: 0
WHEEZING: 0
ALLERGIC/IMMUNOLOGIC COMMENTS: NKA
SORE THROAT: 0
EYE ITCHING: 0
VOMITING: 0
SHORTNESS OF BREATH: 0
CHEST TIGHTNESS: 0
NAUSEA: 0
ABDOMINAL PAIN: 0
CONSTIPATION: 0
COUGH: 0
ABDOMINAL DISTENTION: 0
RHINORRHEA: 0
DIARRHEA: 0

## 2023-02-02 NOTE — PROGRESS NOTES
Cecile Jeffries (:  1960) is a 58 y.o. male,Established patient, here for evaluation of the following chief complaint(s):  Diabetes         ASSESSMENT/PLAN:  1. Type 2 diabetes mellitus without complication, without long-term current use of insulin (HCC)  -     Hemoglobin A1C; Future  -     Lipid Panel; Future  -     Microalbumin, Ur; Future  -     HM DIABETES FOOT EXAM  -     CBC; Future  -     Comprehensive Metabolic Panel; Future  -     [facility] - Carson Madrigal, JEANETTE, Podiatry, [place]  2. Rash and other nonspecific skin eruption  -     fluconazole (DIFLUCAN) 150 MG tablet; Take one tablet once, Disp-1 tablet, R-1Normal  3. MRSA infection  -     fluconazole (DIFLUCAN) 150 MG tablet; Take one tablet once, Disp-1 tablet, R-1Normal  4. Anxiety  -     busPIRone (BUSPAR) 10 MG tablet; Take 1 tablet by mouth 2 times daily as needed (anxiety), Disp-60 tablet, R-1Normal  5. Nail fungal infection  -     [facility] - Carson Madrigal, JEANETTE, Podiatry, [place]  6. Prostate cancer screening  -     PSA Screening; Future  7. Colon cancer screening  -     Fecal DNA Colorectal cancer screening (Cologuard)    Return in about 3 months (around 2023), or if symptoms worsen or fail to improve, for medication follow up, follow up to testing. Subjective   SUBJECTIVE/OBJECTIVE:  Routine follow up  Dm, htn  Was evaluated in walk-in about one month ago for fungal rash. Was treated with antibiotic and nystatin. Reports still has some discoloration and feels the fungal infection is not completely gone. Will treat with diflucan. Last year had to be aggressive with fungal infection  Reports increase in anxiety reporting about once weekly. Has not had a colonoscopy since he was 48      Review of Systems   Constitutional:  Negative for activity change, appetite change, fatigue and fever. HENT:  Negative for congestion, ear pain, postnasal drip, rhinorrhea and sore throat. Eyes:  Negative for discharge, redness, itching and visual disturbance. Wears glasses   Respiratory:  Negative for cough, chest tightness, shortness of breath and wheezing. Cardiovascular:  Negative for chest pain and palpitations. Gastrointestinal:  Negative for abdominal distention, abdominal pain, constipation, diarrhea, nausea and vomiting. Endocrine: Negative for polydipsia, polyphagia and polyuria. DM   Genitourinary:  Negative for difficulty urinating, dysuria and frequency. Musculoskeletal:  Negative for arthralgias and myalgias. Skin:  Positive for rash. Allergic/Immunologic: Negative for environmental allergies and food allergies. NKA   Neurological:  Negative for dizziness, light-headedness and headaches. Psychiatric/Behavioral:  Negative for dysphoric mood and sleep disturbance. The patient is not nervous/anxious. Objective   Physical Exam  Vitals and nursing note reviewed. Constitutional:       General: He is not in acute distress. Appearance: He is not ill-appearing. HENT:      Head: Normocephalic. Nose: Nose normal.      Mouth/Throat:      Lips: Pink. Pulmonary:      Effort: Pulmonary effort is normal. No respiratory distress. Musculoskeletal:      Right foot: Normal range of motion. Left foot: Normal range of motion. Feet:      Right foot:      Protective Sensation: 8 sites tested. 6 sites sensed. Skin integrity: Callus and dry skin present. Toenail Condition: Fungal disease present. Left foot:      Protective Sensation: 8 sites tested. 6 sites sensed. Skin integrity: Callus and dry skin present. Toenail Condition: Fungal disease present. Skin:     Findings: Rash present. Neurological:      Mental Status: He is alert and oriented to person, place, and time. Psychiatric:         Attention and Perception: Attention normal.         Speech: Speech normal.         Behavior: Behavior is cooperative.                 An electronic signature was used to authenticate this note.    --VIVEK Boston - NP

## 2023-02-05 DIAGNOSIS — E11.9 TYPE 2 DIABETES MELLITUS WITHOUT COMPLICATION, WITHOUT LONG-TERM CURRENT USE OF INSULIN (HCC): Primary | ICD-10-CM

## 2023-02-05 DIAGNOSIS — R73.09 HEMOGLOBIN A1C GREATER THAN 9%, INDICATING POOR DIABETIC CONTROL: ICD-10-CM

## 2023-02-05 RX ORDER — SEMAGLUTIDE 1.34 MG/ML
0.25 INJECTION, SOLUTION SUBCUTANEOUS
Qty: 1.5 ML | Refills: 1 | Status: SHIPPED | OUTPATIENT
Start: 2023-02-05 | End: 2024-02-05

## 2023-04-01 DIAGNOSIS — F41.9 ANXIETY: ICD-10-CM

## 2023-04-03 NOTE — TELEPHONE ENCOUNTER
Last visit: 2/2/2023    Last Med refill: 2/2/203  Does patient have enough medication for 72 hours: Yes    Next Visit Date:  Future Appointments   Date Time Provider Selena Gutierrez   8/1/2023  9:45 AM VIVEK Mustafa NP James Bracket [address] Maintenance   Topic Date Due    Diabetic retinal exam  Never done    Colorectal Cancer Screen  Never done    A1C test (Diabetic or Prediabetic)  05/02/2023    DTaP/Tdap/Td vaccine (2 - Td or Tdap) 02/02/2024 (Originally 5/28/2020)    Shingles vaccine (1 of 2) 02/02/2024 (Originally 7/4/2010)    Pneumococcal 0-64 years Vaccine (1 - PCV) 02/02/2024 (Originally 7/4/1966)    COVID-19 Vaccine (1) 02/02/2024 (Originally 1/4/1961)    Flu vaccine (Season Ended) 08/01/2023    Diabetic foot exam  02/02/2024    Diabetic Alb to Cr ratio (uACR) test  02/02/2024    Lipids  02/02/2024    Depression Screen  02/02/2024    GFR test (Diabetes, CKD 3-4, OR last GFR 15-59)  02/02/2024    Hepatitis A vaccine  Aged Out    Hib vaccine  Aged Out    Meningococcal (ACWY) vaccine  Aged Out    Hepatitis C screen  Discontinued    HIV screen  Discontinued    Prostate Specific Antigen (PSA) Screening or Monitoring  Discontinued       Hemoglobin A1C (%)   Date Value   02/02/2023 11.0 (H)   05/03/2022 8.8   10/19/2021 10.3 (H)             ( goal A1C is < 7)   Microalb/Crt.  Ratio (mcg/mg creat)   Date Value   02/02/2023 20 (H)     LDL Cholesterol (mg/dL)   Date Value   02/02/2023 72   10/19/2021 128       (goal LDL is <100)   AST (U/L)   Date Value   02/02/2023 15     ALT (U/L)   Date Value   02/02/2023 17     BUN (mg/dL)   Date Value   02/02/2023 18     BP Readings from Last 3 Encounters:   02/02/23 132/78   01/05/23 124/86   05/03/22 137/82          (goal 120/80)    All Future Testing planned in [facility]  Lab Frequency Next Occurrence               Patient Active Problem List:     Type 2 diabetes mellitus without complication ([facility] [address]     MRSA infection     Hypertension     Other

## 2023-04-04 RX ORDER — BUSPIRONE HYDROCHLORIDE 10 MG/1
TABLET ORAL
Qty: 60 TABLET | Refills: 1 | Status: SHIPPED | OUTPATIENT
Start: 2023-04-04

## 2023-04-25 DIAGNOSIS — E11.9 TYPE 2 DIABETES MELLITUS WITHOUT COMPLICATION, WITHOUT LONG-TERM CURRENT USE OF INSULIN (HCC): ICD-10-CM

## 2023-04-25 DIAGNOSIS — R73.09 HEMOGLOBIN A1C GREATER THAN 9%, INDICATING POOR DIABETIC CONTROL: ICD-10-CM

## 2023-04-26 RX ORDER — SEMAGLUTIDE 1.34 MG/ML
0.5 INJECTION, SOLUTION SUBCUTANEOUS WEEKLY
Qty: 1.5 ML | Refills: 3 | Status: SHIPPED | OUTPATIENT
Start: 2023-04-26 | End: 2024-04-25

## 2023-05-15 DIAGNOSIS — E11.9 TYPE 2 DIABETES MELLITUS WITHOUT COMPLICATION, WITHOUT LONG-TERM CURRENT USE OF INSULIN (HCC): ICD-10-CM

## 2023-06-03 DIAGNOSIS — I10 ESSENTIAL HYPERTENSION: ICD-10-CM

## 2023-06-05 RX ORDER — LISINOPRIL AND HYDROCHLOROTHIAZIDE 12.5; 1 MG/1; MG/1
TABLET ORAL
Qty: 90 TABLET | Refills: 3 | Status: SHIPPED | OUTPATIENT
Start: 2023-06-05

## 2023-08-05 DIAGNOSIS — E11.9 TYPE 2 DIABETES MELLITUS WITHOUT COMPLICATION, WITHOUT LONG-TERM CURRENT USE OF INSULIN (HCC): ICD-10-CM

## 2023-08-05 NOTE — TELEPHONE ENCOUNTER
LOV 2/2/23   RTO added to wait list for new providers  [facility] 11/4/22          Controlled Substance Monitoring:    Acute and Chronic Pain Monitoring:   No flowsheet data found.

## 2024-05-28 ENCOUNTER — OFFICE VISIT (OUTPATIENT)
Dept: FAMILY MEDICINE CLINIC | Age: 64
End: 2024-05-28
Payer: COMMERCIAL

## 2024-05-28 VITALS
SYSTOLIC BLOOD PRESSURE: 114 MMHG | DIASTOLIC BLOOD PRESSURE: 66 MMHG | TEMPERATURE: 98 F | OXYGEN SATURATION: 98 % | WEIGHT: 225 LBS | HEART RATE: 95 BPM | HEIGHT: 75 IN | BODY MASS INDEX: 27.98 KG/M2

## 2024-05-28 DIAGNOSIS — Z23 NEED FOR TDAP VACCINATION: ICD-10-CM

## 2024-05-28 DIAGNOSIS — Z12.5 PROSTATE CANCER SCREENING: ICD-10-CM

## 2024-05-28 DIAGNOSIS — E78.2 MIXED HYPERLIPIDEMIA: ICD-10-CM

## 2024-05-28 DIAGNOSIS — Z13.21 ENCOUNTER FOR VITAMIN DEFICIENCY SCREENING: ICD-10-CM

## 2024-05-28 DIAGNOSIS — I10 ESSENTIAL HYPERTENSION: ICD-10-CM

## 2024-05-28 DIAGNOSIS — Z00.00 ENCOUNTER FOR MEDICAL EXAMINATION TO ESTABLISH CARE: Primary | ICD-10-CM

## 2024-05-28 DIAGNOSIS — E11.9 TYPE 2 DIABETES MELLITUS WITHOUT COMPLICATION, WITHOUT LONG-TERM CURRENT USE OF INSULIN (HCC): ICD-10-CM

## 2024-05-28 DIAGNOSIS — R73.09 HEMOGLOBIN A1C GREATER THAN 9%, INDICATING POOR DIABETIC CONTROL: ICD-10-CM

## 2024-05-28 DIAGNOSIS — F41.9 ANXIETY: ICD-10-CM

## 2024-05-28 LAB — HBA1C MFR BLD: 9.9 %

## 2024-05-28 PROCEDURE — 90715 TDAP VACCINE 7 YRS/> IM: CPT | Performed by: REGISTERED NURSE

## 2024-05-28 PROCEDURE — 83036 HEMOGLOBIN GLYCOSYLATED A1C: CPT | Performed by: REGISTERED NURSE

## 2024-05-28 PROCEDURE — 99214 OFFICE O/P EST MOD 30 MIN: CPT | Performed by: REGISTERED NURSE

## 2024-05-28 PROCEDURE — 3046F HEMOGLOBIN A1C LEVEL >9.0%: CPT | Performed by: REGISTERED NURSE

## 2024-05-28 PROCEDURE — 3078F DIAST BP <80 MM HG: CPT | Performed by: REGISTERED NURSE

## 2024-05-28 PROCEDURE — 90471 IMMUNIZATION ADMIN: CPT | Performed by: REGISTERED NURSE

## 2024-05-28 PROCEDURE — 3074F SYST BP LT 130 MM HG: CPT | Performed by: REGISTERED NURSE

## 2024-05-28 RX ORDER — BUSPIRONE HYDROCHLORIDE 10 MG/1
10 TABLET ORAL 2 TIMES DAILY PRN
Qty: 60 TABLET | Refills: 5 | Status: SHIPPED | OUTPATIENT
Start: 2024-05-28

## 2024-05-28 RX ORDER — SEMAGLUTIDE 1.34 MG/ML
0.5 INJECTION, SOLUTION SUBCUTANEOUS WEEKLY
Qty: 1.5 ML | Refills: 3 | Status: SHIPPED | OUTPATIENT
Start: 2024-05-28 | End: 2025-05-28

## 2024-05-28 RX ORDER — LISINOPRIL AND HYDROCHLOROTHIAZIDE 12.5; 1 MG/1; MG/1
1 TABLET ORAL DAILY
Qty: 90 TABLET | Refills: 3 | Status: SHIPPED | OUTPATIENT
Start: 2024-05-28

## 2024-05-28 RX ORDER — ATORVASTATIN CALCIUM 20 MG/1
20 TABLET, FILM COATED ORAL DAILY
Qty: 90 TABLET | Refills: 1 | Status: SHIPPED | OUTPATIENT
Start: 2024-05-28 | End: 2025-05-28

## 2024-05-28 SDOH — HEALTH STABILITY: PHYSICAL HEALTH: ON AVERAGE, HOW MANY DAYS PER WEEK DO YOU ENGAGE IN MODERATE TO STRENUOUS EXERCISE (LIKE A BRISK WALK)?: 5 DAYS

## 2024-05-28 SDOH — HEALTH STABILITY: PHYSICAL HEALTH: ON AVERAGE, HOW MANY MINUTES DO YOU ENGAGE IN EXERCISE AT THIS LEVEL?: 20 MIN

## 2024-05-28 SDOH — ECONOMIC STABILITY: FOOD INSECURITY: WITHIN THE PAST 12 MONTHS, YOU WORRIED THAT YOUR FOOD WOULD RUN OUT BEFORE YOU GOT MONEY TO BUY MORE.: NEVER TRUE

## 2024-05-28 SDOH — ECONOMIC STABILITY: FOOD INSECURITY: WITHIN THE PAST 12 MONTHS, THE FOOD YOU BOUGHT JUST DIDN'T LAST AND YOU DIDN'T HAVE MONEY TO GET MORE.: NEVER TRUE

## 2024-05-28 SDOH — ECONOMIC STABILITY: INCOME INSECURITY: HOW HARD IS IT FOR YOU TO PAY FOR THE VERY BASICS LIKE FOOD, HOUSING, MEDICAL CARE, AND HEATING?: NOT HARD AT ALL

## 2024-05-28 ASSESSMENT — ENCOUNTER SYMPTOMS
SORE THROAT: 0
NAUSEA: 0
SHORTNESS OF BREATH: 0
CONSTIPATION: 0
VOMITING: 0
ABDOMINAL PAIN: 0
DIARRHEA: 0
COUGH: 0
SINUS PRESSURE: 0

## 2024-05-28 ASSESSMENT — ANXIETY QUESTIONNAIRES
7. FEELING AFRAID AS IF SOMETHING AWFUL MIGHT HAPPEN: NOT AT ALL
3. WORRYING TOO MUCH ABOUT DIFFERENT THINGS: NOT AT ALL
1. FEELING NERVOUS, ANXIOUS, OR ON EDGE: NOT AT ALL
5. BEING SO RESTLESS THAT IT IS HARD TO SIT STILL: NOT AT ALL
6. BECOMING EASILY ANNOYED OR IRRITABLE: NOT AT ALL
GAD7 TOTAL SCORE: 0
2. NOT BEING ABLE TO STOP OR CONTROL WORRYING: NOT AT ALL
4. TROUBLE RELAXING: NOT AT ALL
IF YOU CHECKED OFF ANY PROBLEMS ON THIS QUESTIONNAIRE, HOW DIFFICULT HAVE THESE PROBLEMS MADE IT FOR YOU TO DO YOUR WORK, TAKE CARE OF THINGS AT HOME, OR GET ALONG WITH OTHER PEOPLE: NOT DIFFICULT AT ALL

## 2024-05-28 ASSESSMENT — PATIENT HEALTH QUESTIONNAIRE - PHQ9
1. LITTLE INTEREST OR PLEASURE IN DOING THINGS: NOT AT ALL
SUM OF ALL RESPONSES TO PHQ9 QUESTIONS 1 & 2: 0
2. FEELING DOWN, DEPRESSED OR HOPELESS: NOT AT ALL
SUM OF ALL RESPONSES TO PHQ QUESTIONS 1-9: 0

## 2024-05-28 NOTE — PROGRESS NOTES
[facility]  [facility] [address] [address]  Dept: [phone]    CHIEF COMPLAINT:      Chief Complaint   Patient presents with    Diabetes     Previous Sandra patient    Hypertension     F/u       ASSESSMENT & PLAN      1. Encounter for medical examination to establish care  2. Type 2 diabetes mellitus without complication, without long-term current use of insulin (HCC)  -     POCT glycosylated hemoglobin (Hb A1C)  -     Comprehensive Metabolic Panel, Fasting; Future  -     Lipid Panel; Future  -      DIABETES FOOT EXAM  -     Microalbumin, Ur; Future  -     SITagliptin (JANUVIA) 100 MG tablet; Take 1 tablet by mouth daily, Disp-90 tablet, R-1Normal  -     Semaglutide,0.25 or 0.5MG/DOS, (OZEMPIC, 0.25 OR 0.5 MG/DOSE,) 2 MG/1.5ML SOPN; Inject 0.5 mg into the skin once a week, Disp-1.5 mL, R-3Normal  -     metFORMIN (GLUCOPHAGE) 1000 MG tablet; Take 1 tablet by mouth 2 times daily (with meals), Disp-180 tablet, R-1Normal  -     [facility] - Arianna Chavez DPM, Podiatry, [place]  3. Essential hypertension  -     lisinopril-hydroCHLOROthiazide (PRINZIDE;ZESTORETIC) 10-12.5 MG per tablet; Take 1 tablet by mouth daily, Disp-90 tablet, R-3Normal  -     CBC; Future  4. Mixed hyperlipidemia  -     Lipid Panel; Future  -     atorvastatin (LIPITOR) 20 MG tablet; Take 1 tablet by mouth daily, Disp-90 tablet, R-1Normal  5. Hemoglobin A1C greater than 9%, indicating poor diabetic control  -     Semaglutide,0.25 or 0.5MG/DOS, (OZEMPIC, 0.25 OR 0.5 MG/DOSE,) 2 MG/1.5ML SOPN; Inject 0.5 mg into the skin once a week, Disp-1.5 mL, R-3Normal  6. Anxiety  -     busPIRone (BUSPAR) 10 MG tablet; Take 1 tablet by mouth 2 times daily as needed (anxiety), Disp-60 tablet, R-5Normal  7. Encounter for vitamin deficiency screening  -     Vitamin D 25 Hydroxy; Future  8. Prostate cancer screening  -     [facility] Screening Colonoscopy  9. Need for Tdap vaccination  -     Tdap, BOOSTRIX, (age 10

## 2024-06-13 ENCOUNTER — TELEPHONE (OUTPATIENT)
Dept: SURGERY | Age: 64
End: 2024-06-13

## 2024-06-13 NOTE — TELEPHONE ENCOUNTER
Spoke to patient (1st attempt) he is starting a new job and will need to sort out insurance. Patient will call when ready to schedule colonoscopy, confirmed office number.

## 2024-08-23 ENCOUNTER — HOSPITAL ENCOUNTER (OUTPATIENT)
Age: 64
Setting detail: SPECIMEN
Discharge: HOME | End: 2024-08-23

## 2024-08-23 ENCOUNTER — OFFICE VISIT (OUTPATIENT)
Dept: PRIMARY CARE CLINIC | Age: 64
End: 2024-08-23
Payer: COMMERCIAL

## 2024-08-23 VITALS
DIASTOLIC BLOOD PRESSURE: 82 MMHG | WEIGHT: 213 LBS | BODY MASS INDEX: 26.62 KG/M2 | HEART RATE: 88 BPM | SYSTOLIC BLOOD PRESSURE: 130 MMHG | OXYGEN SATURATION: 98 % | TEMPERATURE: 98.5 F

## 2024-08-23 DIAGNOSIS — R82.90 ABNORMAL URINE ODOR: ICD-10-CM

## 2024-08-23 DIAGNOSIS — R30.0 DYSURIA: ICD-10-CM

## 2024-08-23 DIAGNOSIS — R81 GLUCOSURIA: ICD-10-CM

## 2024-08-23 DIAGNOSIS — N41.0 ACUTE PROSTATITIS: Primary | ICD-10-CM

## 2024-08-23 LAB
BILIRUBIN, POC: NEGATIVE
BLOOD URINE, POC: NEGATIVE
CHP ED QC CHECK: NORMAL
CLARITY, POC: CLEAR
COLOR, POC: YELLOW
GLUCOSE BLD-MCNC: 222 MG/DL
GLUCOSE URINE, POC: NORMAL
KETONES, POC: NEGATIVE
LEUKOCYTE EST, POC: NEGATIVE
NITRITE, POC: NEGATIVE
PH, POC: 5.5
PROTEIN, POC: NEGATIVE
SPECIFIC GRAVITY, POC: 1.03
UROBILINOGEN, POC: 0.2

## 2024-08-23 PROCEDURE — 81003 URINALYSIS AUTO W/O SCOPE: CPT

## 2024-08-23 PROCEDURE — 3079F DIAST BP 80-89 MM HG: CPT

## 2024-08-23 PROCEDURE — 82962 GLUCOSE BLOOD TEST: CPT

## 2024-08-23 PROCEDURE — 3075F SYST BP GE 130 - 139MM HG: CPT

## 2024-08-23 PROCEDURE — 99213 OFFICE O/P EST LOW 20 MIN: CPT

## 2024-08-23 RX ORDER — SULFAMETHOXAZOLE AND TRIMETHOPRIM 800; 160 MG/1; MG/1
1 TABLET ORAL 2 TIMES DAILY
Qty: 20 TABLET | Refills: 0 | Status: SHIPPED | OUTPATIENT
Start: 2024-08-23 | End: 2024-09-02

## 2024-08-23 ASSESSMENT — ENCOUNTER SYMPTOMS
DIARRHEA: 1
NAUSEA: 0
VOMITING: 0
ABDOMINAL PAIN: 0

## 2024-08-23 NOTE — PATIENT INSTRUCTIONS
Finish the entire course of antibiotic treatment.  Continue with supportive treatment measures.  Push hydration and avoid bladder irritants.  Follow-up with PCP.

## 2024-08-23 NOTE — PROGRESS NOTES
MG per tablet; Take 1 tablet by mouth 2 times daily for 10 days, Disp-20 tablet, R-0Normal  2. Glucosuria  -     POCT Glucose  3. Dysuria  -     Culture, Urine; Future  -     POCT Urinalysis No Micro (Auto)  4. Abnormal urine odor  -     Culture, Urine; Future  -     POCT Urinalysis No Micro (Auto)       :      No follow-ups on file.    Orders Placed This Encounter   Medications    sulfamethoxazole-trimethoprim (BACTRIM DS;SEPTRA DS) 800-160 MG per tablet     Sig: Take 1 tablet by mouth 2 times daily for 10 days     Dispense:  20 tablet     Refill:  0     Results for POC orders placed in visit on 08/23/24   POCT Urinalysis No Micro (Auto)   Result Value Ref Range    Color, UA Yellow     Clarity, UA Clear     Glucose, UA  mg     Bilirubin, UA Negative     Ketones, UA Negative     Spec Grav, UA 1.030     Blood, UA POC Negative     pH, UA 5.5     Protein, UA POC Negative     Urobilinogen, UA 0.2     Leukocytes, UA Negative     Nitrite, UA Negative    POCT Glucose   Result Value Ref Range    POC Glucose 222     QC OK?       Urinalysis performed in office and results reviewed with the patient. Sending urine for culture, will call with results.   POCT glucose performed due to glucosuria on UA; reinforced close follow-up with PCP.  Will initiate empiric treatment for suspected prostatitis.  Instructed to finish the entire course of antibiotic treatment.  Continue with supportive treatment measures.  Push hydration and avoid bladder irritants.  Follow-up with PCP.       Patient and/or parent given educational materials - see patient instructions.  Discussed use, benefit, and side effects of prescribed medications.  All patient questions answered.  Patient and/or parent voiced understanding.      Electronically signed by VIVEK Coronado 8/23/2024 at 10:58 AM

## 2024-08-24 LAB
MICROORGANISM SPEC CULT: NORMAL
SERVICE CMNT-IMP: NORMAL
SPECIMEN DESCRIPTION: NORMAL

## 2024-09-26 ENCOUNTER — TELEPHONE (OUTPATIENT)
Dept: GASTROENTEROLOGY | Age: 64
End: 2024-09-26

## 2024-11-11 ENCOUNTER — OFFICE VISIT (OUTPATIENT)
Dept: FAMILY MEDICINE CLINIC | Age: 64
End: 2024-11-11

## 2024-11-11 VITALS
DIASTOLIC BLOOD PRESSURE: 72 MMHG | SYSTOLIC BLOOD PRESSURE: 118 MMHG | TEMPERATURE: 97.7 F | RESPIRATION RATE: 16 BRPM | WEIGHT: 217 LBS | HEART RATE: 91 BPM | BODY MASS INDEX: 27.12 KG/M2 | OXYGEN SATURATION: 98 %

## 2024-11-11 DIAGNOSIS — L03.90 CELLULITIS, UNSPECIFIED CELLULITIS SITE: Primary | ICD-10-CM

## 2024-11-11 PROCEDURE — 3074F SYST BP LT 130 MM HG: CPT | Performed by: NURSE PRACTITIONER

## 2024-11-11 PROCEDURE — 99213 OFFICE O/P EST LOW 20 MIN: CPT | Performed by: NURSE PRACTITIONER

## 2024-11-11 PROCEDURE — 3078F DIAST BP <80 MM HG: CPT | Performed by: NURSE PRACTITIONER

## 2024-11-11 RX ORDER — CEPHALEXIN 500 MG/1
500 CAPSULE ORAL 3 TIMES DAILY
Qty: 21 CAPSULE | Refills: 0 | Status: SHIPPED | OUTPATIENT
Start: 2024-11-11 | End: 2024-11-18

## 2024-11-11 RX ORDER — MUPIROCIN 20 MG/G
OINTMENT TOPICAL
Qty: 15 G | Refills: 0 | Status: SHIPPED | OUTPATIENT
Start: 2024-11-11 | End: 2024-11-18

## 2024-11-11 ASSESSMENT — ENCOUNTER SYMPTOMS
SHORTNESS OF BREATH: 0
ABDOMINAL PAIN: 0
VOMITING: 0
SORE THROAT: 0
TROUBLE SWALLOWING: 0
NAUSEA: 0
COUGH: 0
WHEEZING: 0
RHINORRHEA: 0

## 2024-11-12 NOTE — PROGRESS NOTES
[facility] Walk-in  [address]  [address]  [place] [address]    Jarvis Lamar is a 64 y.o. male who presents today for his medical conditions/complaints of Rash (X 4 days. Neck. Redness and pain. )          HPI:     /72   Pulse 91   Temp 97.7 °F (36.5 °C)   Resp 16   Wt 98.4 kg (217 lb)   SpO2 98%   BMI 27.12 kg/m²       Rash  This is a new problem. The current episode started in the past 7 days. The problem has been gradually worsening since onset. Location: anterior neck. The rash is characterized by redness, pain, itchiness and dryness. He was exposed to nothing. Pertinent negatives include no congestion, cough, fever, rhinorrhea, shortness of breath, sore throat or vomiting. Past treatments include nothing. The treatment provided no relief.       Past Medical History:   Diagnosis Date    Hyperlipemia     Hyperlipidemia     Hypertension     MRSA infection     Type 2 diabetes mellitus without complication (HCC)     Wears glasses         Past Surgical History:   Procedure Laterality Date    HIATAL HERNIA REPAIR  1965    right side    TONSILLECTOMY      TUMOR REMOVAL Right     Neck benign    WRIST SURGERY      torn ligament       Family History   Problem Relation Age of Onset    Other Mother         Alzheimers -73    Dementia Mother     High Blood Pressure Father     Diabetes Father     Other Father         Parkinsons    Parkinsonism Father     Diabetes Brother     Diabetes Brother        Social History     Tobacco Use    Smoking status: Never    Smokeless tobacco: Never   Substance Use Topics    Alcohol use: Not Currently        Prior to Visit Medications    Medication Sig Taking? Authorizing Provider   cephALEXin (KEFLEX) 500 MG capsule Take 1 capsule by mouth 3 times daily for 7 days Yes Felicitas Holder APRN - CNP   mupirocin (BACTROBAN) 2 % ointment Apply topically 3 times daily. Yes Felicitas Holder APRN - CNP   SITagliptin (JANUVIA) 100 MG tablet Take 1 tablet by mouth

## 2024-11-21 ENCOUNTER — OFFICE VISIT (OUTPATIENT)
Dept: FAMILY MEDICINE CLINIC | Age: 64
End: 2024-11-21

## 2024-11-21 VITALS
TEMPERATURE: 98.8 F | SYSTOLIC BLOOD PRESSURE: 124 MMHG | DIASTOLIC BLOOD PRESSURE: 72 MMHG | OXYGEN SATURATION: 98 % | HEART RATE: 83 BPM | RESPIRATION RATE: 16 BRPM

## 2024-11-21 DIAGNOSIS — L03.221 CELLULITIS OF NECK: Primary | ICD-10-CM

## 2024-11-21 PROCEDURE — 3078F DIAST BP <80 MM HG: CPT | Performed by: NURSE PRACTITIONER

## 2024-11-21 PROCEDURE — 3074F SYST BP LT 130 MM HG: CPT | Performed by: NURSE PRACTITIONER

## 2024-11-21 PROCEDURE — 99213 OFFICE O/P EST LOW 20 MIN: CPT | Performed by: NURSE PRACTITIONER

## 2024-11-21 RX ORDER — CETIRIZINE HYDROCHLORIDE 10 MG/1
10 TABLET ORAL DAILY
COMMUNITY
Start: 2024-11-21

## 2024-11-21 RX ORDER — SULFAMETHOXAZOLE AND TRIMETHOPRIM 800; 160 MG/1; MG/1
1 TABLET ORAL 2 TIMES DAILY
Qty: 20 TABLET | Refills: 0 | Status: SHIPPED | OUTPATIENT
Start: 2024-11-21 | End: 2024-12-01

## 2024-11-21 ASSESSMENT — ENCOUNTER SYMPTOMS
VOMITING: 0
COLOR CHANGE: 1
TROUBLE SWALLOWING: 0
FACIAL SWELLING: 0
NAUSEA: 0

## 2024-11-21 NOTE — PROGRESS NOTES
[facility] WALK-[facility]  [address]  [address]  [facility] [place] [facility] [address]  Dept: [phone]  Dept Fax: [phone]    Jarvis Lamar is a 64 y.o. male who presents today for his medical conditions/complaints of   Chief Complaint   Patient presents with    Rash     X 2 weeks. Previously treated for cellulitis. Completed course of Keflex with improvement. Redness located around the neck.          HPI:     /72   Pulse 83   Temp 98.8 °F (37.1 °C)   Resp 16   SpO2 98%       HPI   Pt presented to the walk in today with c/o rash to his neck which started 2 weeks ago.  Was seen here on 11/11 diagnosed with cellulitis and placed on Keflex and Bactroban.  Pt states there was improvement, but did not completely resolve.  There is no fever, chills, drainage, nausea.  The area is warm to the touch and can be itchy.  See images uploaded into media file.     Past Medical History:   Diagnosis Date    Hyperlipemia     Hyperlipidemia     Hypertension     MRSA infection     Type 2 diabetes mellitus without complication (HCC)     Wears glasses         Past Surgical History:   Procedure Laterality Date    HIATAL HERNIA REPAIR  1965    right side    TONSILLECTOMY      TUMOR REMOVAL Right     Neck benign    WRIST SURGERY      torn ligament       Family History   Problem Relation Age of Onset    Other Mother         Alzheimers -73    Dementia Mother     High Blood Pressure Father     Diabetes Father     Other Father         Parkinsons    Parkinsonism Father     Diabetes Brother     Diabetes Brother        Social History     Tobacco Use    Smoking status: Never    Smokeless tobacco: Never   Substance Use Topics    Alcohol use: Not Currently        Prior to Visit Medications    Medication Sig Taking? Authorizing Provider   cetirizine (ZYRTEC) 10 MG tablet Take 1 tablet by mouth daily Yes Katherine Guzmán, APRN - CNP   sulfamethoxazole-trimethoprim (BACTRIM DS)

## 2024-12-02 ENCOUNTER — OFFICE VISIT (OUTPATIENT)
Dept: FAMILY MEDICINE CLINIC | Age: 64
End: 2024-12-02

## 2024-12-02 VITALS
WEIGHT: 221.7 LBS | BODY MASS INDEX: 27.71 KG/M2 | HEART RATE: 83 BPM | SYSTOLIC BLOOD PRESSURE: 132 MMHG | DIASTOLIC BLOOD PRESSURE: 82 MMHG | OXYGEN SATURATION: 98 %

## 2024-12-02 DIAGNOSIS — L03.221 CELLULITIS OF NECK: Primary | ICD-10-CM

## 2024-12-02 DIAGNOSIS — B37.2 CANDIDA INFECTION OF FLEXURAL SKIN: ICD-10-CM

## 2024-12-02 PROCEDURE — 3075F SYST BP GE 130 - 139MM HG: CPT

## 2024-12-02 PROCEDURE — 3079F DIAST BP 80-89 MM HG: CPT

## 2024-12-02 PROCEDURE — 99214 OFFICE O/P EST MOD 30 MIN: CPT

## 2024-12-02 RX ORDER — SULFAMETHOXAZOLE AND TRIMETHOPRIM 800; 160 MG/1; MG/1
1 TABLET ORAL 2 TIMES DAILY
Qty: 10 TABLET | Refills: 0 | Status: SHIPPED | OUTPATIENT
Start: 2024-12-02 | End: 2024-12-07

## 2024-12-02 NOTE — PROGRESS NOTES
[facility]  [facility] PRIMARY CARE  [address]  [place] [address]    [facility]YSICIANS [facility], [facility] WALK-IN  [address] [facility] [address] [facility] [address]   [address]  [facility] [address]  Dept: [phone]    Jarvis Lamar is a 64 y.o. male Established patient, who presents to the walk-in clinic today with conditions/complaints as noted below:    Chief Complaint   Patient presents with    Skin Problem     Follow up for cellulitis x10 days          HPI:     Patient presents for ongoing cellulitis of his neck.  He has been on 2 rounds of oral ABX.  Just finished Bactrim. He is type 2 DM.  Has not had updated labs due to self pay.  Will have insurance in Jan.  He states redness is drastically improving.  Is now dry and some itching.  States skin tear on anterior neck was much worse and has been improving.  Has remained afebrile.  Does not check his blood sugars.           Past Medical History:   Diagnosis Date    Hyperlipemia     Hyperlipidemia     Hypertension     MRSA infection     Type 2 diabetes mellitus without complication (HCC)     Wears glasses        Current Outpatient Medications   Medication Sig Dispense Refill    Clotrimazole 1 % OINT Apply 1 Application topically in the morning and at bedtime 56.7 g 0    sulfamethoxazole-trimethoprim (BACTRIM DS) 800-160 MG per tablet Take 1 tablet by mouth 2 times daily for 5 days 10 tablet 0    cetirizine (ZYRTEC) 10 MG tablet Take 1 tablet by mouth daily      SITagliptin (JANUVIA) 100 MG tablet Take 1 tablet by mouth daily 90 tablet 1    Semaglutide,0.25 or 0.5MG/DOS, (OZEMPIC, 0.25 OR 0.5 MG/DOSE,) 2 MG/1.5ML SOPN Inject 0.5 mg into the skin once a week 1.5 mL 3    atorvastatin (LIPITOR) 20 MG tablet Take 1 tablet by mouth daily 90 tablet 1    busPIRone (BUSPAR) 10 MG tablet Take 1 tablet by mouth 2 times daily as needed (anxiety) 60 tablet 5    lisinopril-hydroCHLOROthiazide (PRINZIDE;ZESTORETIC) 10-12.5 MG per

## 2024-12-03 ASSESSMENT — ENCOUNTER SYMPTOMS
SORE THROAT: 0
NAUSEA: 0
EYE DISCHARGE: 0
CONSTIPATION: 0
VOMITING: 0
DIARRHEA: 0
COLOR CHANGE: 1
COUGH: 0
SHORTNESS OF BREATH: 0

## 2024-12-17 ENCOUNTER — HOSPITAL ENCOUNTER (OUTPATIENT)
Age: 64
Setting detail: SPECIMEN
Discharge: HOME | End: 2024-12-17

## 2024-12-17 ENCOUNTER — OFFICE VISIT (OUTPATIENT)
Dept: FAMILY MEDICINE CLINIC | Age: 64
End: 2024-12-17

## 2024-12-17 VITALS
OXYGEN SATURATION: 97 % | DIASTOLIC BLOOD PRESSURE: 80 MMHG | TEMPERATURE: 98 F | BODY MASS INDEX: 27.5 KG/M2 | SYSTOLIC BLOOD PRESSURE: 132 MMHG | WEIGHT: 220 LBS | HEART RATE: 81 BPM

## 2024-12-17 DIAGNOSIS — B35.0 TINEA BARBAE: Primary | ICD-10-CM

## 2024-12-17 DIAGNOSIS — R21 RASH OF NECK: ICD-10-CM

## 2024-12-17 DIAGNOSIS — L03.221 CELLULITIS OF NECK: ICD-10-CM

## 2024-12-17 PROCEDURE — 3075F SYST BP GE 130 - 139MM HG: CPT | Performed by: NURSE PRACTITIONER

## 2024-12-17 PROCEDURE — 99213 OFFICE O/P EST LOW 20 MIN: CPT | Performed by: NURSE PRACTITIONER

## 2024-12-17 PROCEDURE — 3079F DIAST BP 80-89 MM HG: CPT | Performed by: NURSE PRACTITIONER

## 2024-12-17 RX ORDER — SULFAMETHOXAZOLE AND TRIMETHOPRIM 800; 160 MG/1; MG/1
1 TABLET ORAL 2 TIMES DAILY
Qty: 20 TABLET | Refills: 0 | Status: SHIPPED | OUTPATIENT
Start: 2024-12-17 | End: 2024-12-27

## 2024-12-17 ASSESSMENT — ENCOUNTER SYMPTOMS
VOMITING: 0
NAUSEA: 0
COLOR CHANGE: 1

## 2024-12-17 NOTE — PROGRESS NOTES
Eyes:      Extraocular Movements: Extraocular movements intact.   Cardiovascular:      Rate and Rhythm: Normal rate and regular rhythm.   Pulmonary:      Effort: Pulmonary effort is normal.      Breath sounds: Normal breath sounds.   Abdominal:      Palpations: Abdomen is soft.   Musculoskeletal:         General: Normal range of motion.      Cervical back: Normal range of motion and neck supple.   Skin:     General: Skin is warm and dry.      Findings: Erythema and rash present.      Comments: Neck- see images uploaded into media file.    Neurological:      Mental Status: He is alert and oriented to person, place, and time.   Psychiatric:         Thought Content: Thought content normal.           MEDICAL DECISION MAKING Assessment/Plan:     Jarvis was seen today for skin problem.    Diagnoses and all orders for this visit:    Tinea barbae  -     fluconazole (DIFLUCAN) 100 MG tablet; Take 1 tablet by mouth daily for 14 days  -     ketoconazole (NIZORAL) 2 % shampoo; Apply topically to the neck daily as needed.    Rash of neck  -     Culture, Wound (with Gram Stain); Future  -     Varicella Zoster/Herpes Simplex 1 & 2, Molecular; Future  -     sulfamethoxazole-trimethoprim (BACTRIM DS) 800-160 MG per tablet; Take 1 tablet by mouth 2 times daily for 10 days  -     Jamal Carey PA, Dermatology, [place]    Cellulitis of neck        Results for orders placed or performed during the hospital encounter of 08/23/24   Culture, Urine    Specimen: Urine, clean catch   Result Value Ref Range    Specimen Description .CLEAN CATCH URINE     Special Requests Site: Urine     Culture NO SIGNIFICANT GROWTH      Patient presents for ongoing cellulitis/rash of his neck. He has been on 3 rounds of oral ABX. Just finished Bactrim 10 days ago. Also recently treated for candida infection of neck. Was seen here on 12/2 treated with Bactrim and Clortrimazole. Has also been using Neosporin ointment and a scrub. He is type 2 DM. He

## 2024-12-18 LAB
HSV1 DNA SPEC QL NAA+PROBE: NEGATIVE
HSV2 DNA SPEC QL NAA+PROBE: NEGATIVE
SOURCE: NORMAL
SPECIMEN DESCRIPTION: NORMAL
VZV DNA SPEC QL NAA+PROBE: NEGATIVE
VZV DNA SPEC QL NAA+PROBE: NORMAL

## 2024-12-19 LAB
MICROORGANISM SPEC CULT: NORMAL
MICROORGANISM/AGENT SPEC: NORMAL
MICROORGANISM/AGENT SPEC: NORMAL
SPECIMEN DESCRIPTION: NORMAL

## 2024-12-20 RX ORDER — KETOCONAZOLE 20 MG/ML
SHAMPOO, SUSPENSION TOPICAL
Qty: 120 ML | Refills: 0 | Status: SHIPPED | OUTPATIENT
Start: 2024-12-20

## 2024-12-20 RX ORDER — FLUCONAZOLE 100 MG/1
100 TABLET ORAL DAILY
Qty: 14 TABLET | Refills: 0 | Status: SHIPPED | OUTPATIENT
Start: 2024-12-20 | End: 2025-01-03

## 2024-12-31 ENCOUNTER — APPOINTMENT (OUTPATIENT)
Dept: GENERAL RADIOLOGY | Age: 64
End: 2024-12-31

## 2024-12-31 ENCOUNTER — HOSPITAL ENCOUNTER (EMERGENCY)
Age: 64
Discharge: HOME | End: 2024-12-31
Attending: EMERGENCY MEDICINE

## 2024-12-31 VITALS
BODY MASS INDEX: 27.48 KG/M2 | TEMPERATURE: 98.2 F | WEIGHT: 221 LBS | OXYGEN SATURATION: 99 % | HEART RATE: 92 BPM | HEIGHT: 75 IN | SYSTOLIC BLOOD PRESSURE: 157 MMHG | DIASTOLIC BLOOD PRESSURE: 94 MMHG | RESPIRATION RATE: 16 BRPM

## 2024-12-31 DIAGNOSIS — S90.121A CONTUSION OF LESSER TOE OF RIGHT FOOT WITHOUT DAMAGE TO NAIL, INITIAL ENCOUNTER: Primary | ICD-10-CM

## 2024-12-31 LAB
ANION GAP SERPL CALCULATED.3IONS-SCNC: 11 MMOL/L (ref 9–17)
BASOPHILS # BLD: 0.1 K/UL (ref 0–0.2)
BASOPHILS NFR BLD: 1 % (ref 0–2)
BUN SERPL-MCNC: 18 MG/DL (ref 8–23)
CALCIUM SERPL-MCNC: 10.1 MG/DL (ref 8.6–10.4)
CHLORIDE SERPL-SCNC: 99 MMOL/L (ref 98–107)
CO2 SERPL-SCNC: 27 MMOL/L (ref 20–31)
CREAT SERPL-MCNC: 0.9 MG/DL (ref 0.7–1.2)
EOSINOPHIL # BLD: 0.2 K/UL (ref 0–0.4)
EOSINOPHILS RELATIVE PERCENT: 3 % (ref 1–4)
ERYTHROCYTE [DISTWIDTH] IN BLOOD BY AUTOMATED COUNT: 13.1 % (ref 12.5–15.4)
GFR, ESTIMATED: >90 ML/MIN/1.73M2
GLUCOSE BLD-MCNC: 305 MG/DL (ref 75–110)
GLUCOSE SERPL-MCNC: 274 MG/DL (ref 70–99)
HCT VFR BLD AUTO: 46.4 % (ref 41–53)
HGB BLD-MCNC: 15.9 G/DL (ref 13.5–17.5)
LYMPHOCYTES NFR BLD: 2.4 K/UL (ref 1–4.8)
LYMPHOCYTES RELATIVE PERCENT: 29 % (ref 24–44)
MCH RBC QN AUTO: 31.1 PG (ref 26–34)
MCHC RBC AUTO-ENTMCNC: 34.3 G/DL (ref 31–37)
MCV RBC AUTO: 90.8 FL (ref 80–100)
MONOCYTES NFR BLD: 0.6 K/UL (ref 0.1–1.2)
MONOCYTES NFR BLD: 7 % (ref 2–11)
NEUTROPHILS NFR BLD: 60 % (ref 36–66)
NEUTS SEG NFR BLD: 5 K/UL (ref 1.8–7.7)
PLATELET # BLD AUTO: 213 K/UL (ref 140–450)
PMV BLD AUTO: 8.6 FL (ref 6–12)
POTASSIUM SERPL-SCNC: 4.4 MMOL/L (ref 3.7–5.3)
RBC # BLD AUTO: 5.1 M/UL (ref 4.5–5.9)
SODIUM SERPL-SCNC: 137 MMOL/L (ref 135–144)
WBC OTHER # BLD: 8.3 K/UL (ref 3.5–11)

## 2024-12-31 PROCEDURE — 80048 BASIC METABOLIC PNL TOTAL CA: CPT

## 2024-12-31 PROCEDURE — 99284 EMERGENCY DEPT VISIT MOD MDM: CPT

## 2024-12-31 PROCEDURE — 85025 COMPLETE CBC W/AUTO DIFF WBC: CPT

## 2024-12-31 PROCEDURE — 36415 COLL VENOUS BLD VENIPUNCTURE: CPT

## 2024-12-31 PROCEDURE — 82947 ASSAY GLUCOSE BLOOD QUANT: CPT

## 2024-12-31 PROCEDURE — 73630 X-RAY EXAM OF FOOT: CPT

## 2024-12-31 ASSESSMENT — PAIN - FUNCTIONAL ASSESSMENT: PAIN_FUNCTIONAL_ASSESSMENT: 0-10

## 2024-12-31 ASSESSMENT — PAIN DESCRIPTION - LOCATION: LOCATION: TOE (COMMENT WHICH ONE)

## 2024-12-31 ASSESSMENT — ENCOUNTER SYMPTOMS
DIARRHEA: 0
VOMITING: 0
CONSTIPATION: 0
NAUSEA: 0
ABDOMINAL PAIN: 0
BACK PAIN: 0
EYE DISCHARGE: 0
SHORTNESS OF BREATH: 0
COUGH: 0

## 2024-12-31 ASSESSMENT — LIFESTYLE VARIABLES: HOW OFTEN DO YOU HAVE A DRINK CONTAINING ALCOHOL: NEVER

## 2024-12-31 NOTE — ED PROVIDER NOTES
[facility] EMERGENCY DEPARTMENT  eMERGENCY dEPARTMENT eNCOUnter   Independent Attestation     Pt Name: Jarvis Lamar  MRN: [id number]  Birthdate 1960  Date of evaluation: 12/31/24       Jarvis Lamar is a 64 y.o. male who presents with Toe Pain (Rt little toe swelling and bruising noted w slight blistering; this afternoon around 11am; denies any new injury, fall or new shoes. Type 2 diabetic. )        Based on the medical record, the care appears appropriate. I was personally available for consultation in the Emergency Department.    Karin Amezquita DO  Attending Emergency  Physician               Karin Amezquita DO  12/31/24 7089    
normal.         Behavior: Behavior normal.         DIAGNOSTIC RESULTS     EKG: All EKG's are interpreted by the Emergency Department Physician who either signs or Co-signs this chart in the absence of a cardiologist.        RADIOLOGY:   Non-plain film images such as CT, Ultrasound and MRI are read by the radiologist. Plain radiographic images are visualized and preliminarily interpreted by the emergency physician with the below findings:        Interpretation per the Radiologist below, if available at the time of this note:    XR FOOT RIGHT (MIN 3 VIEWS)   Final Result   No acute osseous abnormality identified.               ED BEDSIDE ULTRASOUND:   Performed by ED Physician - none    LABS:  Labs Reviewed   BASIC METABOLIC PANEL - Abnormal; Notable for the following components:       Result Value    Glucose 274 (*)     All other components within normal limits   POC GLUCOSE FINGERSTICK - Abnormal; Notable for the following components:    POC Glucose 305 (*)     All other components within normal limits   CBC WITH AUTO DIFFERENTIAL       All other labs were within normal range or not returned as of this dictation.    EMERGENCY DEPARTMENT COURSE and DIFFERENTIAL DIAGNOSIS/MDM:   Vitals:    Vitals:    12/31/24 1556   BP: (!) 157/94   Pulse: 92   Resp: 16   Temp: 98.2 °F (36.8 °C)   TempSrc: Oral   SpO2: 99%   Weight: 100.2 kg (221 lb)   Height: 1.905 m (6' 3\")           Medical Decision Making  This is a nontoxic-appearing 64-year-old male who has a history of type 2 diabetes, has history of neuropathy as well reports that around 11:00 today while on the phone talking to his brother he was looking at his feet and noticed discoloration to his right fifth digit, was concerned due to the discoloration and also having diabetes prompting him to come to the emergency department for evaluation.  The patient denies any known injuries, no alleviating measures were attempted prior to arrival the patient has otherwise been

## 2024-12-31 NOTE — DISCHARGE INSTRUCTIONS
Return to ER: Fevers, redness warmth or swelling to the foot, streaks of red going up the foot or leg, pain; or any other concerning symptoms.

## 2025-01-14 SDOH — HEALTH STABILITY: PHYSICAL HEALTH: ON AVERAGE, HOW MANY DAYS PER WEEK DO YOU ENGAGE IN MODERATE TO STRENUOUS EXERCISE (LIKE A BRISK WALK)?: 1 DAY

## 2025-01-14 SDOH — HEALTH STABILITY: PHYSICAL HEALTH: ON AVERAGE, HOW MANY MINUTES DO YOU ENGAGE IN EXERCISE AT THIS LEVEL?: 20 MIN

## 2025-02-25 SDOH — HEALTH STABILITY: PHYSICAL HEALTH: ON AVERAGE, HOW MANY MINUTES DO YOU ENGAGE IN EXERCISE AT THIS LEVEL?: 20 MIN

## 2025-02-25 SDOH — HEALTH STABILITY: PHYSICAL HEALTH: ON AVERAGE, HOW MANY DAYS PER WEEK DO YOU ENGAGE IN MODERATE TO STRENUOUS EXERCISE (LIKE A BRISK WALK)?: 5 DAYS

## 2025-02-26 ENCOUNTER — OFFICE VISIT (OUTPATIENT)
Dept: PRIMARY CARE CLINIC | Age: 65
End: 2025-02-26

## 2025-02-26 VITALS
BODY MASS INDEX: 27.06 KG/M2 | HEART RATE: 90 BPM | SYSTOLIC BLOOD PRESSURE: 138 MMHG | HEIGHT: 75 IN | DIASTOLIC BLOOD PRESSURE: 84 MMHG | RESPIRATION RATE: 16 BRPM | OXYGEN SATURATION: 98 % | WEIGHT: 217.6 LBS

## 2025-02-26 DIAGNOSIS — E11.9 TYPE 2 DIABETES MELLITUS WITHOUT COMPLICATION, WITHOUT LONG-TERM CURRENT USE OF INSULIN (HCC): ICD-10-CM

## 2025-02-26 DIAGNOSIS — E78.49 OTHER HYPERLIPIDEMIA: ICD-10-CM

## 2025-02-26 DIAGNOSIS — R21 RASH AND OTHER NONSPECIFIC SKIN ERUPTION: ICD-10-CM

## 2025-02-26 DIAGNOSIS — R73.09 HEMOGLOBIN A1C GREATER THAN 9%, INDICATING POOR DIABETIC CONTROL: ICD-10-CM

## 2025-02-26 DIAGNOSIS — I10 PRIMARY HYPERTENSION: ICD-10-CM

## 2025-02-26 DIAGNOSIS — Z12.5 PROSTATE CANCER SCREENING: ICD-10-CM

## 2025-02-26 DIAGNOSIS — Z76.89 ENCOUNTER TO ESTABLISH CARE: Primary | ICD-10-CM

## 2025-02-26 PROBLEM — R22.1 NECK SWELLING: Status: RESOLVED | Noted: 2019-02-06 | Resolved: 2025-02-26

## 2025-02-26 RX ORDER — ATORVASTATIN CALCIUM 20 MG/1
20 TABLET, FILM COATED ORAL DAILY
Qty: 90 TABLET | Refills: 2 | Status: SHIPPED | OUTPATIENT
Start: 2025-02-26 | End: 2026-02-26

## 2025-02-26 RX ORDER — SEMAGLUTIDE 1.34 MG/ML
0.5 INJECTION, SOLUTION SUBCUTANEOUS WEEKLY
Qty: 1.5 ML | Refills: 3 | Status: SHIPPED | OUTPATIENT
Start: 2025-02-26 | End: 2026-02-26

## 2025-02-26 RX ORDER — LISINOPRIL AND HYDROCHLOROTHIAZIDE 10; 12.5 MG/1; MG/1
1 TABLET ORAL DAILY
Qty: 90 TABLET | Refills: 3 | Status: SHIPPED | OUTPATIENT
Start: 2025-02-26

## 2025-02-26 SDOH — ECONOMIC STABILITY: FOOD INSECURITY: WITHIN THE PAST 12 MONTHS, YOU WORRIED THAT YOUR FOOD WOULD RUN OUT BEFORE YOU GOT MONEY TO BUY MORE.: NEVER TRUE

## 2025-02-26 SDOH — ECONOMIC STABILITY: FOOD INSECURITY: WITHIN THE PAST 12 MONTHS, THE FOOD YOU BOUGHT JUST DIDN'T LAST AND YOU DIDN'T HAVE MONEY TO GET MORE.: NEVER TRUE

## 2025-02-26 ASSESSMENT — PATIENT HEALTH QUESTIONNAIRE - PHQ9
2. FEELING DOWN, DEPRESSED OR HOPELESS: NOT AT ALL
SUM OF ALL RESPONSES TO PHQ QUESTIONS 1-9: 0
SUM OF ALL RESPONSES TO PHQ9 QUESTIONS 1 & 2: 0
1. LITTLE INTEREST OR PLEASURE IN DOING THINGS: NOT AT ALL

## 2025-02-26 NOTE — PROGRESS NOTES
[facility]  [facility] PRIMARY CARE  [address] [place] [address]   [address]  [facility] [address]  Dept: [phone]  Dept Fax: [phone]    Jarvis Lamar is a 64 y.o. male who presentstoday for his medical conditions/complaints as noted below.  Jarvis Lamar is c/o of  Chief Complaint   Patient presents with    New Patient         HPI:     History of Present Illness  The patient is a 64-year-old male who presents to [facility] care and for evaluation of fungal infection, diabetes mellitus, anxiety, and health maintenance.    He reports a persistent fungal infection, which he believes originated in November. The infection does not cause itching but induces a cyclical burning sensation approximately every week. He has documented the progression of the infection through photographs. He has discontinued shaving and the application of any products on his face since November. He has a scheduled appointment with a dermatologist on 05/07/2025. He was previously treated with Bactrim, ketoconazole, and fluconazole, which were effective in reducing the burning sensation. He has been off all medications for a few months.    His last recorded A1c level in May 2024 was 9. He has been off his medication for the past 2 weeks. He has made significant dietary modifications, including the incorporation of oatmeal, blueberries, strawberries, and avocados into his diet. He adheres to a strict diet 85 percent of the time, allowing himself 4.5 days per month to deviate from this regimen. He underwent an eye examination in September 2024. He was prescribed Januvia, but due to its high cost, he has been unable to continue this treatment. He is currently on Ozempic 0.5 mg, which he reports as beneficial.    He experienced anxiety attacks, initially occurring weekly, but have since decreased in frequency over the past few years. He has had one attack in the last 3 months. He has a prescription for BuSpar, which he takes as

## 2025-03-01 ASSESSMENT — ENCOUNTER SYMPTOMS
TROUBLE SWALLOWING: 0
VOMITING: 0
ABDOMINAL PAIN: 0
EYE ITCHING: 0
COUGH: 0
CHEST TIGHTNESS: 0
EYE REDNESS: 0
SINUS PRESSURE: 0
NAUSEA: 0
SINUS PAIN: 0
SHORTNESS OF BREATH: 0
EYE DISCHARGE: 0
WHEEZING: 0
DIARRHEA: 0
SORE THROAT: 0

## 2025-03-27 ENCOUNTER — HOSPITAL ENCOUNTER (OUTPATIENT)
Age: 65
Setting detail: SPECIMEN
Discharge: HOME | End: 2025-03-27

## 2025-03-27 DIAGNOSIS — E78.49 OTHER HYPERLIPIDEMIA: ICD-10-CM

## 2025-03-27 DIAGNOSIS — E11.9 TYPE 2 DIABETES MELLITUS WITHOUT COMPLICATION, WITHOUT LONG-TERM CURRENT USE OF INSULIN: ICD-10-CM

## 2025-03-27 DIAGNOSIS — Z12.5 PROSTATE CANCER SCREENING: ICD-10-CM

## 2025-03-27 LAB
CHOLEST SERPL-MCNC: 130 MG/DL (ref 0–199)
CHOLESTEROL/HDL RATIO: 3
EST. AVERAGE GLUCOSE BLD GHB EST-MCNC: 252 MG/DL
HBA1C MFR BLD: 10.4 % (ref 4–6)
HDLC SERPL-MCNC: 43 MG/DL
LDLC SERPL CALC-MCNC: 69 MG/DL (ref 0–100)
PSA SERPL-MCNC: 0.99 NG/ML (ref 0–4)
TRIGL SERPL-MCNC: 91 MG/DL
VLDLC SERPL CALC-MCNC: 18 MG/DL (ref 1–30)

## 2025-03-28 ENCOUNTER — RESULTS FOLLOW-UP (OUTPATIENT)
Dept: PRIMARY CARE CLINIC | Age: 65
End: 2025-03-28

## 2025-04-07 ENCOUNTER — HOSPITAL ENCOUNTER (OUTPATIENT)
Age: 65
Setting detail: SPECIMEN
Discharge: HOME | End: 2025-04-07

## 2025-04-07 ENCOUNTER — RESULTS FOLLOW-UP (OUTPATIENT)
Dept: PRIMARY CARE CLINIC | Age: 65
End: 2025-04-07

## 2025-04-07 DIAGNOSIS — E11.9 TYPE 2 DIABETES MELLITUS WITHOUT COMPLICATION, WITHOUT LONG-TERM CURRENT USE OF INSULIN: ICD-10-CM

## 2025-04-07 LAB
CREAT UR-MCNC: 100 MG/DL (ref 39–259)
MICROALBUMIN UR-MCNC: <12 MG/L (ref 0–20)
MICROALBUMIN/CREAT UR-RTO: NORMAL MCG/MG CREAT (ref 0–17)

## 2025-05-28 ENCOUNTER — OFFICE VISIT (OUTPATIENT)
Dept: PRIMARY CARE CLINIC | Age: 65
End: 2025-05-28
Payer: COMMERCIAL

## 2025-05-28 VITALS
SYSTOLIC BLOOD PRESSURE: 116 MMHG | RESPIRATION RATE: 16 BRPM | WEIGHT: 216 LBS | HEART RATE: 73 BPM | OXYGEN SATURATION: 98 % | BODY MASS INDEX: 27 KG/M2 | DIASTOLIC BLOOD PRESSURE: 70 MMHG

## 2025-05-28 DIAGNOSIS — I10 PRIMARY HYPERTENSION: ICD-10-CM

## 2025-05-28 DIAGNOSIS — E11.9 TYPE 2 DIABETES MELLITUS WITHOUT COMPLICATION, WITHOUT LONG-TERM CURRENT USE OF INSULIN (HCC): Primary | ICD-10-CM

## 2025-05-28 PROCEDURE — 3078F DIAST BP <80 MM HG: CPT | Performed by: NURSE PRACTITIONER

## 2025-05-28 PROCEDURE — 3046F HEMOGLOBIN A1C LEVEL >9.0%: CPT | Performed by: NURSE PRACTITIONER

## 2025-05-28 PROCEDURE — 99214 OFFICE O/P EST MOD 30 MIN: CPT | Performed by: NURSE PRACTITIONER

## 2025-05-28 PROCEDURE — 3074F SYST BP LT 130 MM HG: CPT | Performed by: NURSE PRACTITIONER

## 2025-05-28 SDOH — ECONOMIC STABILITY: FOOD INSECURITY: WITHIN THE PAST 12 MONTHS, THE FOOD YOU BOUGHT JUST DIDN'T LAST AND YOU DIDN'T HAVE MONEY TO GET MORE.: NEVER TRUE

## 2025-05-28 SDOH — ECONOMIC STABILITY: FOOD INSECURITY: WITHIN THE PAST 12 MONTHS, YOU WORRIED THAT YOUR FOOD WOULD RUN OUT BEFORE YOU GOT MONEY TO BUY MORE.: NEVER TRUE

## 2025-05-28 ASSESSMENT — ENCOUNTER SYMPTOMS
SORE THROAT: 0
SINUS PRESSURE: 0
ABDOMINAL PAIN: 0
DIARRHEA: 0
EYE REDNESS: 0
SINUS PAIN: 0
VOMITING: 0
WHEEZING: 0
COUGH: 0
SHORTNESS OF BREATH: 0
NAUSEA: 0
EYE ITCHING: 0
CHEST TIGHTNESS: 0
TROUBLE SWALLOWING: 0
EYE DISCHARGE: 0

## 2025-05-28 ASSESSMENT — PATIENT HEALTH QUESTIONNAIRE - PHQ9
2. FEELING DOWN, DEPRESSED OR HOPELESS: NOT AT ALL
SUM OF ALL RESPONSES TO PHQ QUESTIONS 1-9: 0
1. LITTLE INTEREST OR PLEASURE IN DOING THINGS: NOT AT ALL

## 2025-05-28 NOTE — PROGRESS NOTES
[facility]  [facility] PRIMARY CARE  [address] [place] [address] [address]  [address]  [facility] [address]  Dept: [phone]  Dept Fax: [phone]    Jarvis Lamar is a 64 y.o. male who presentstoday for his medical conditions/complaints as noted below.  Jarvis Lamar is c/o of  Chief Complaint   Patient presents with    3 Month Follow-Up     -A1c  -Medication check   -Blood pressure check          HPI:     History of Present Illness  The patient presents for evaluation of diabetes.    He has been managing diabetes through dietary modifications and lifestyle changes, resulting in a stable weight range of 215 to 216 pounds. He anticipates that his blood glucose levels will decrease to the 150s or lower by the end of the year. Currently, he is on metformin. Not on Ozempic due to cost. but has expressed a desire to discontinue Ozempic long-term. He transitioned to Medicare and [employer] for his prescriptions as of 07/01/2025. He expects his average blood glucose level to be around 200. Expects ozempic to be covered in July     He occasionally consumes Diet Coke and substitutes it with tea and other beverages. Coffee intake is minimal. He has incorporated broccoli into his daily diet, despite not being fond of it. Nuts are also included in his diet. He has a preference for chips and French fries but refrains from purchasing them due to lack of control over consumption.    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      Hemoglobin A1C (%)   Date Value   03/27/2025 10.4 (H)   05/28/2024 9.9   02/02/2023 11.0 (H)             ( goal A1C is < 7)   No components found for: \"LABMICR\"  No components found for: \"LDLCHOLESTEROL\", \"LDLCALC\"    (goal LDL is <100)   AST (U/L)   Date Value   02/02/2023 15     ALT (U/L)   Date Value   02/02/2023 17     BUN (mg/dL)   Date Value   12/31/2024 18     BP Readings from Last 3 Encounters:   05/28/25 116/70   02/26/25 138/84